# Patient Record
Sex: FEMALE | Race: WHITE | Employment: PART TIME | URBAN - METROPOLITAN AREA
[De-identification: names, ages, dates, MRNs, and addresses within clinical notes are randomized per-mention and may not be internally consistent; named-entity substitution may affect disease eponyms.]

---

## 2017-06-20 ENCOUNTER — HOSPITAL ENCOUNTER (INPATIENT)
Facility: HOSPITAL | Age: 67
LOS: 2 days | Discharge: HOME | DRG: 390 | End: 2017-06-23
Attending: EMERGENCY MEDICINE | Admitting: HOSPITALIST
Payer: MEDICARE

## 2017-06-20 DIAGNOSIS — K56.609 SMALL BOWEL OBSTRUCTION (HCC): Primary | ICD-10-CM

## 2017-06-20 PROCEDURE — 96375 TX/PRO/DX INJ NEW DRUG ADDON: CPT

## 2017-06-20 PROCEDURE — 80053 COMPREHEN METABOLIC PANEL: CPT | Performed by: EMERGENCY MEDICINE

## 2017-06-20 PROCEDURE — 99285 EMERGENCY DEPT VISIT HI MDM: CPT

## 2017-06-20 PROCEDURE — 43753 TX GASTRO INTUB W/ASP: CPT

## 2017-06-20 PROCEDURE — 85025 COMPLETE CBC W/AUTO DIFF WBC: CPT | Performed by: EMERGENCY MEDICINE

## 2017-06-20 PROCEDURE — 96376 TX/PRO/DX INJ SAME DRUG ADON: CPT

## 2017-06-20 PROCEDURE — 0D9670Z DRAINAGE OF STOMACH WITH DRAINAGE DEVICE, VIA NATURAL OR ARTIFICIAL OPENING: ICD-10-PCS | Performed by: EMERGENCY MEDICINE

## 2017-06-20 PROCEDURE — 96361 HYDRATE IV INFUSION ADD-ON: CPT

## 2017-06-20 PROCEDURE — 96374 THER/PROPH/DIAG INJ IV PUSH: CPT

## 2017-06-20 RX ORDER — KETOROLAC TROMETHAMINE 30 MG/ML
15 INJECTION, SOLUTION INTRAMUSCULAR; INTRAVENOUS ONCE
Status: COMPLETED | OUTPATIENT
Start: 2017-06-20 | End: 2017-06-20

## 2017-06-20 RX ORDER — ONDANSETRON 2 MG/ML
4 INJECTION INTRAMUSCULAR; INTRAVENOUS ONCE
Status: COMPLETED | OUTPATIENT
Start: 2017-06-20 | End: 2017-06-20

## 2017-06-21 ENCOUNTER — APPOINTMENT (OUTPATIENT)
Dept: CT IMAGING | Facility: HOSPITAL | Age: 67
DRG: 390 | End: 2017-06-21
Attending: EMERGENCY MEDICINE
Payer: MEDICARE

## 2017-06-21 PROBLEM — R79.89 AZOTEMIA: Status: ACTIVE | Noted: 2017-06-21

## 2017-06-21 PROBLEM — R73.9 HYPERGLYCEMIA: Status: ACTIVE | Noted: 2017-06-21

## 2017-06-21 PROBLEM — D72.829 LEUKOCYTOSIS: Status: ACTIVE | Noted: 2017-06-21

## 2017-06-21 PROBLEM — K56.609 SMALL BOWEL OBSTRUCTION (HCC): Status: ACTIVE | Noted: 2017-06-21

## 2017-06-21 PROCEDURE — C9113 INJ PANTOPRAZOLE SODIUM, VIA: HCPCS | Performed by: HOSPITALIST

## 2017-06-21 PROCEDURE — 74177 CT ABD & PELVIS W/CONTRAST: CPT | Performed by: EMERGENCY MEDICINE

## 2017-06-21 PROCEDURE — 82962 GLUCOSE BLOOD TEST: CPT

## 2017-06-21 RX ORDER — SODIUM CHLORIDE 9 MG/ML
INJECTION, SOLUTION INTRAVENOUS CONTINUOUS
Status: ACTIVE | OUTPATIENT
Start: 2017-06-21 | End: 2017-06-21

## 2017-06-21 RX ORDER — ACETAMINOPHEN 10 MG/ML
1000 INJECTION, SOLUTION INTRAVENOUS EVERY 6 HOURS PRN
Status: DISCONTINUED | OUTPATIENT
Start: 2017-06-21 | End: 2017-06-23

## 2017-06-21 RX ORDER — MORPHINE SULFATE 2 MG/ML
1 INJECTION, SOLUTION INTRAMUSCULAR; INTRAVENOUS EVERY 2 HOUR PRN
Status: DISCONTINUED | OUTPATIENT
Start: 2017-06-21 | End: 2017-06-21

## 2017-06-21 RX ORDER — KETOROLAC TROMETHAMINE 30 MG/ML
30 INJECTION, SOLUTION INTRAMUSCULAR; INTRAVENOUS EVERY 6 HOURS PRN
Status: DISPENSED | OUTPATIENT
Start: 2017-06-21 | End: 2017-06-23

## 2017-06-21 RX ORDER — METOCLOPRAMIDE HYDROCHLORIDE 5 MG/ML
10 INJECTION INTRAMUSCULAR; INTRAVENOUS EVERY 6 HOURS PRN
Status: DISCONTINUED | OUTPATIENT
Start: 2017-06-21 | End: 2017-06-23

## 2017-06-21 RX ORDER — ONDANSETRON 2 MG/ML
4 INJECTION INTRAMUSCULAR; INTRAVENOUS EVERY 4 HOURS PRN
Status: DISCONTINUED | OUTPATIENT
Start: 2017-06-21 | End: 2017-06-21

## 2017-06-21 RX ORDER — HYDROMORPHONE HYDROCHLORIDE 1 MG/ML
1 INJECTION, SOLUTION INTRAMUSCULAR; INTRAVENOUS; SUBCUTANEOUS EVERY 2 HOUR PRN
Status: DISCONTINUED | OUTPATIENT
Start: 2017-06-21 | End: 2017-06-21

## 2017-06-21 RX ORDER — HYDROMORPHONE HYDROCHLORIDE 1 MG/ML
0.5 INJECTION, SOLUTION INTRAMUSCULAR; INTRAVENOUS; SUBCUTANEOUS EVERY 2 HOUR PRN
Status: DISCONTINUED | OUTPATIENT
Start: 2017-06-21 | End: 2017-06-21

## 2017-06-21 RX ORDER — KETOROLAC TROMETHAMINE 30 MG/ML
15 INJECTION, SOLUTION INTRAMUSCULAR; INTRAVENOUS ONCE
Status: COMPLETED | OUTPATIENT
Start: 2017-06-21 | End: 2017-06-21

## 2017-06-21 RX ORDER — DEXTROSE MONOHYDRATE 25 G/50ML
50 INJECTION, SOLUTION INTRAVENOUS
Status: DISCONTINUED | OUTPATIENT
Start: 2017-06-21 | End: 2017-06-23

## 2017-06-21 RX ORDER — ENOXAPARIN SODIUM 100 MG/ML
40 INJECTION SUBCUTANEOUS DAILY
Status: DISCONTINUED | OUTPATIENT
Start: 2017-06-21 | End: 2017-06-23

## 2017-06-21 RX ORDER — SODIUM CHLORIDE 9 MG/ML
INJECTION, SOLUTION INTRAVENOUS CONTINUOUS
Status: DISCONTINUED | OUTPATIENT
Start: 2017-06-21 | End: 2017-06-23

## 2017-06-21 RX ORDER — ONDANSETRON 2 MG/ML
4 INJECTION INTRAMUSCULAR; INTRAVENOUS EVERY 6 HOURS PRN
Status: DISCONTINUED | OUTPATIENT
Start: 2017-06-21 | End: 2017-06-23

## 2017-06-21 RX ORDER — LABETALOL HYDROCHLORIDE 5 MG/ML
10 INJECTION, SOLUTION INTRAVENOUS ONCE
Status: DISCONTINUED | OUTPATIENT
Start: 2017-06-21 | End: 2017-06-23

## 2017-06-21 NOTE — H&P
[facility] Hospitalist H&P       CC: abdominal pain    PCP: Ryan Lira MD    History of Present Illness:     Pt is a 74 yo with HTN/HL, DMII, anxiety, who is admitted for abdominal pain.  Describes it as intermittent moderate abdominal cramping to lower q Every Day Smoker  0.25 Packs/Day  For 36.00 Years     Types: Cigarettes    Smokeless tobacco: Never Used    Alcohol Use: Yes  0.0 - 0.6 oz/week    0-1 Standard drinks or equivalent per week         Comment: occasional        Fam Hx  Family History   Proble ALT  21   AST  15   ALB  3.8       No results for input(s): TROP in the last 72 hours. Radiology: Ct Abdomen+pelvis(contrast Only)(cpt=74177)    6/21/2017  PROCEDURE:  CT ABDOMEN+PELVIS(CONTRAST ONLY)(CPT=74177)  CO  6/21/2017  CONCLUSION:  1.  Macy Dacosta hospitalization lasting longer than 2 midnights. Based on the her current state of illness, I estimate that she will require 4 days of inpatient hospitalization.       Based on the her current state of illness, I anticipate that, after discharge, she violeta

## 2017-06-21 NOTE — PROGRESS NOTES
Patient still c/o abdominal pain. Perfect serve message sent to Dr Bello for additional pain med order.

## 2017-06-21 NOTE — PROGRESS NOTES
NURSING ADMISSION NOTE      Patient admitted via cart from ER  Oriented to room. Safety precautions initiated. Bed in low position. Call light in reach.   NG to low intermittent suction

## 2017-06-21 NOTE — PROGRESS NOTES
Brief Internal Medicine Note    Full Note to Follow      Pt is a 74 yo with HTN/HL, DMII, anxiety, who is admitted for abdominal pain.   Describes it as intermittent moderate abdominal cramping to lower quadrants with no exacerbating or alleviating featur

## 2017-06-21 NOTE — ED PROVIDER NOTES
Patient Seen in: [facility] Emergency Department    History   Patient presents with:  Abdomen/Flank Pain (GI/)    Stated Complaint: c/o abd pain with n/v    HPI    43-year-old with a history of diabetes, hypertension, high cholesterol, anxiety, pr FOOD   lisinopril 10 MG Oral Tab,  Take 1 tablet (10 mg total) by mouth once daily.    ATORVASTATIN 10 MG Oral Tab,  TAKE ONE TABLET BY MOUTH EVERY EVENING   TRIAMTERENE-HCTZ 37.5-25 MG Oral Tab,  TAKE ONE TABLET BY MOUTH EVERY DAY   Triamterene-HCTZ 37.5-2 Pulse 84  Temp(Src) 97.8 °F (36.6 °C) (Temporal)  Resp 11  Ht 170.2 cm (5' 7\")  Wt 84.369 kg  BMI 29.12 kg/m2  SpO2 98%        Physical Exam    General: Patient is awake, alert in no acute distress. HEENT: Sclera are not icteric.   Conjunctivae within no the prior study  Patient was given IV fluids, Zofran, Toradol.   NG tube was ordered per general surgery recommendation  MDM     27-year-old with history of bowel obstruction presents with recurrent symptoms and CT demonstrates recurrent small bowel obstruc

## 2017-06-21 NOTE — ED INITIAL ASSESSMENT (HPI)
Pt with c/o abd pain with nausea starting today. Hx of bowel obstruction in the past, feels similar.

## 2017-06-21 NOTE — PROGRESS NOTES
Patient improved since receiving Reglan IV. Refusing pain medication at this time. Decreased nausea.

## 2017-06-21 NOTE — CONSULTS
[facility]  Report of Consultation    Samy Ellis Patient Status:  Inpatient    1950 MRN [id number]   [facility] 3SW-A Attending Valerie Koenig, *   Hosp Day # 1 PCP Miller Fraser MD     Reason for Consultation:    Surg She reports that she drinks alcohol. She reports that she does not use illicit drugs.     Allergies:      Bees                    Itching, Swelling  Codeine [Opioid Goldie*      Morphine [Morphine *    Nausea and vomiting  Statins                     Current M LANCETS 33G Does not apply Misc TEST BLOOD GLUCOSE TWO TIMES A DAY Disp: 200 each Rfl: 3   aspirin 81 MG Oral Tab Take 81 mg by mouth daily.  Disp:  Rfl:    Blood Glucose Monitoring Suppl (ONETOUCH ULTRA MINI) W/DEVICE Does not apply Kit Test blood sugar on transmitted to the [facility] [employer] of Radiology) Valerie Machado 35 ([address]) which includes the Dose Index Registry.   PATIENT STATED HISTORY:(As transcribed by Technologist)  Patient complaints of abdominal pain , nausea, vomiting since y Approved by: Marilee Ley MD               Problem List:    Patient Active Problem List:     Hypertension     Diabetes mellitus ([facility] [address]     Tobacco use disorder     Overweight (BMI 25.0-29. 9)     PVD (posterior vitreous detachment)     Hyperlipidemia     An

## 2017-06-22 PROCEDURE — 85025 COMPLETE CBC W/AUTO DIFF WBC: CPT | Performed by: HOSPITALIST

## 2017-06-22 PROCEDURE — 82962 GLUCOSE BLOOD TEST: CPT

## 2017-06-22 PROCEDURE — 83036 HEMOGLOBIN GLYCOSYLATED A1C: CPT | Performed by: HOSPITALIST

## 2017-06-22 PROCEDURE — C9113 INJ PANTOPRAZOLE SODIUM, VIA: HCPCS | Performed by: HOSPITALIST

## 2017-06-22 NOTE — PLAN OF CARE
Pt tolerated NG tube clamped for 6hr. No nausea or pain. No residual. NG tube DC'd per order from PA. Started on CLD. Will continue to leona.

## 2017-06-22 NOTE — PROGRESS NOTES
[facility]  Progress Note    Caryle Collard Patient Status:  Inpatient    1950 MRN [id number]   [facility] Attending Conchetta [place], *   Hosp Day # 2 PCP Oanh Waldron MD     Subjective:    Patient passing flatus, fe

## 2017-06-22 NOTE — PROGRESS NOTES
[facility] Hospitalist Progress Note     PCP: Hoa Jackson MD    CC:  Follow up    SUBJECTIVE:  Sitting up in chair, passing flatus. NG clamped. Talking about grandchildren, football games, college.  In good spirits    OBJECTIVE:  Temp:  [97.7 °F (36.5 °C)-98.4 **OR** dextrose 50% **OR** glucose **OR** Glucose-Vitamin C       Assessment/Plan:       72 yo with HTN/HL, DMII, anxiety, who is admitted for abdominal pain.       **abdominal pain--pt with bowel obstruction with transition point to R mid abdomen on CT-im

## 2017-06-23 VITALS
WEIGHT: 186 LBS | HEIGHT: 67 IN | RESPIRATION RATE: 20 BRPM | TEMPERATURE: 99 F | DIASTOLIC BLOOD PRESSURE: 45 MMHG | HEART RATE: 59 BPM | BODY MASS INDEX: 29.19 KG/M2 | OXYGEN SATURATION: 98 % | SYSTOLIC BLOOD PRESSURE: 141 MMHG

## 2017-06-23 PROCEDURE — C9113 INJ PANTOPRAZOLE SODIUM, VIA: HCPCS | Performed by: HOSPITALIST

## 2017-06-23 PROCEDURE — 82962 GLUCOSE BLOOD TEST: CPT

## 2017-06-23 NOTE — DISCHARGE SUMMARY
General Medicine Discharge Summary     Patient ID:  Lizz Zepeda  73year old  [id number]  7/28/1950    Admit date: 6/20/2017    Discharge date and time: 6/23/2017  4:20 PM     Attending Physician:     Primary Care Physician: Luis Prado MD     Reason ordered    **DMII-hold orals. Iss, accuchecks. HbA1c 7.7-- f/u with PCP    **anxiety-no acute issues. Not on meds at home.  Monitor    **leukocytosis-reactive, monitor  Consults: IP CONSULT TO GENERAL SURGERY  IP CONSULT TO HOSPITALIST    Radiology:  Ct Abd evidence for acute diverticulitis. Small amount of free fluid within the pelvis. ABDOMINAL WALL:  Stable. URINARY BLADDER:  Normal.  No visible focal wall thickening, lesion, or calculus. PELVIC NODES:  Normal.  No adenopathy.   PELVIC ORGANS:  Pelvic or 2-YEN) 0.3 MG/0.3ML Injection Device  Inject 2 Syringes as directed once. Normal, 2 Syringe, Injection, Once, Disp-2 Device, R-1          Home Medication Changes:      Activity: activity as tolerated  Diet: as directed  Wound Care: none needed  Code Status:

## 2017-06-23 NOTE — PLAN OF CARE
Pt discharge education completed. All questions addressed. All pt belongings packed and sent with pt.  Discharged home via personal car

## 2017-06-23 NOTE — PROGRESS NOTES
[facility]  Progress Note    Nicole Oar Patient Status:  Inpatient    1950 MRN [id number]   [facility] 3SW-A Attending Meera Weldon, *   Hosp Day # 3 PCP Ignacio Awad MD     Subjective:    Patient tolerating NG clam

## 2017-10-04 PROCEDURE — 82570 ASSAY OF URINE CREATININE: CPT | Performed by: INTERNAL MEDICINE

## 2017-10-04 PROCEDURE — 82043 UR ALBUMIN QUANTITATIVE: CPT | Performed by: INTERNAL MEDICINE

## 2017-10-04 PROCEDURE — 86803 HEPATITIS C AB TEST: CPT | Performed by: INTERNAL MEDICINE

## 2017-10-12 PROBLEM — R73.9 HYPERGLYCEMIA: Status: RESOLVED | Noted: 2017-06-21 | Resolved: 2017-10-12

## 2017-10-12 PROBLEM — R79.89 AZOTEMIA: Status: RESOLVED | Noted: 2017-06-21 | Resolved: 2017-10-12

## 2017-10-12 PROBLEM — D72.829 LEUKOCYTOSIS: Status: RESOLVED | Noted: 2017-06-21 | Resolved: 2017-10-12

## 2017-10-12 PROBLEM — K56.609 SMALL BOWEL OBSTRUCTION (HCC): Status: RESOLVED | Noted: 2017-06-21 | Resolved: 2017-10-12

## 2018-03-09 ENCOUNTER — HOSPITAL ENCOUNTER (OUTPATIENT)
Age: 68
Discharge: HOME | End: 2018-03-09
Attending: FAMILY MEDICINE
Payer: MEDICARE

## 2018-03-09 VITALS
HEART RATE: 102 BPM | OXYGEN SATURATION: 97 % | RESPIRATION RATE: 20 BRPM | DIASTOLIC BLOOD PRESSURE: 71 MMHG | TEMPERATURE: 99 F | SYSTOLIC BLOOD PRESSURE: 136 MMHG

## 2018-03-09 DIAGNOSIS — L50.9 URTICARIA: Primary | ICD-10-CM

## 2018-03-09 PROCEDURE — 99213 OFFICE O/P EST LOW 20 MIN: CPT

## 2018-03-09 PROCEDURE — 99214 OFFICE O/P EST MOD 30 MIN: CPT

## 2018-03-09 RX ORDER — EPINEPHRINE 0.3 MG/.3ML
0.3 INJECTION SUBCUTANEOUS ONCE
Qty: 1 EACH | Refills: 0 | Status: SHIPPED | OUTPATIENT
Start: 2018-03-09 | End: 2018-03-09

## 2018-03-09 RX ORDER — FAMOTIDINE 20 MG/1
20 TABLET ORAL 2 TIMES DAILY
Qty: 14 TABLET | Refills: 0 | Status: SHIPPED | OUTPATIENT
Start: 2018-03-09 | End: 2018-03-16

## 2018-03-09 NOTE — ED INITIAL ASSESSMENT (HPI)
Pt here with swelling to right eye, right ear lobe and hives to the back of her head since this morning. Also reports non productive cough x 2 days. Denies difficulty breathing, swallowing.

## 2018-03-09 NOTE — ED PROVIDER NOTES
Patient Seen in: [facility]    History   Patient presents with:   Allergic Rxn Allergies (immune)  Cough/URI    Stated Complaint: right ear & eyelid swollen,lumps on back of head    HPI    This 15-year-old female presents to the eyelid swollen,lumps on back of head  Other systems are as noted in HPI. Constitutional and vital signs reviewed. All other systems reviewed and negative except as noted above.     Physical Exam   ED Triage Vitals [03/09/18 0910]  BP: 136/71  Pulse: 1 doctor in 3-5 days for any new or worsening symptoms. I advised the patient that I would like to avoid any prednisone at this time since her hives are mild and she is a diabetic.         Disposition and Plan     Clinical Impression:  Urticaria  (primary en

## 2018-05-25 PROCEDURE — 82043 UR ALBUMIN QUANTITATIVE: CPT | Performed by: INTERNAL MEDICINE

## 2018-05-25 PROCEDURE — 82570 ASSAY OF URINE CREATININE: CPT | Performed by: INTERNAL MEDICINE

## 2020-07-24 PROBLEM — I70.0 AORTIC ATHEROSCLEROSIS (HCC): Status: ACTIVE | Noted: 2020-07-24

## 2021-01-28 PROBLEM — R80.9 TYPE 2 DIABETES MELLITUS WITH MICROALBUMINURIA, WITHOUT LONG-TERM CURRENT USE OF INSULIN: Status: ACTIVE | Noted: 2021-01-28

## 2021-01-28 PROBLEM — E11.29 TYPE 2 DIABETES MELLITUS WITH MICROALBUMINURIA, WITHOUT LONG-TERM CURRENT USE OF INSULIN: Status: ACTIVE | Noted: 2021-01-28

## 2021-01-28 PROBLEM — R80.9 TYPE 2 DIABETES MELLITUS WITH MICROALBUMINURIA, WITHOUT LONG-TERM CURRENT USE OF INSULIN (HCC): Status: ACTIVE | Noted: 2021-01-28

## 2021-01-28 PROBLEM — E11.29 TYPE 2 DIABETES MELLITUS WITH MICROALBUMINURIA, WITHOUT LONG-TERM CURRENT USE OF INSULIN (HCC): Status: ACTIVE | Noted: 2021-01-28

## 2021-01-29 PROBLEM — E11.9 TYPE 2 DIABETES MELLITUS WITHOUT COMPLICATION, WITHOUT LONG-TERM CURRENT USE OF INSULIN (HCC): Status: ACTIVE | Noted: 2021-01-28

## 2022-06-08 ENCOUNTER — HOSPITAL ENCOUNTER (OUTPATIENT)
Age: 72
Discharge: HOME | End: 2022-06-08
Payer: MEDICARE

## 2022-06-08 VITALS
BODY MASS INDEX: 27 KG/M2 | OXYGEN SATURATION: 98 % | TEMPERATURE: 98 F | DIASTOLIC BLOOD PRESSURE: 53 MMHG | HEART RATE: 70 BPM | SYSTOLIC BLOOD PRESSURE: 170 MMHG | RESPIRATION RATE: 18 BRPM | WEIGHT: 165.38 LBS

## 2022-06-08 DIAGNOSIS — S91.311A LACERATION OF RIGHT FOOT, INITIAL ENCOUNTER: Primary | ICD-10-CM

## 2022-06-08 PROCEDURE — 99202 OFFICE O/P NEW SF 15 MIN: CPT

## 2022-06-08 PROCEDURE — 12002 RPR S/N/AX/GEN/TRNK2.6-7.5CM: CPT

## 2022-06-08 PROCEDURE — 99203 OFFICE O/P NEW LOW 30 MIN: CPT

## 2022-06-18 ENCOUNTER — HOSPITAL ENCOUNTER (OUTPATIENT)
Age: 72
Discharge: HOME | End: 2022-06-18
Payer: MEDICARE

## 2022-06-18 VITALS
OXYGEN SATURATION: 99 % | BODY MASS INDEX: 26.68 KG/M2 | HEIGHT: 66 IN | HEART RATE: 76 BPM | RESPIRATION RATE: 18 BRPM | DIASTOLIC BLOOD PRESSURE: 79 MMHG | SYSTOLIC BLOOD PRESSURE: 156 MMHG | WEIGHT: 166 LBS | TEMPERATURE: 98 F

## 2022-06-18 DIAGNOSIS — Z48.02 ENCOUNTER FOR REMOVAL OF SUTURES: Primary | ICD-10-CM

## 2022-08-26 ENCOUNTER — TELEPHONE (OUTPATIENT)
Dept: UROLOGY | Facility: CLINIC | Age: 72
End: 2022-08-26

## 2022-08-26 NOTE — TELEPHONE ENCOUNTER
Left voice message for patient to call back to r/s appt with Dr Dwight Gong on 9/1. Patient is an HMO and there is NO referral on file.

## 2022-08-31 ENCOUNTER — TELEPHONE (OUTPATIENT)
Dept: UROLOGY | Facility: CLINIC | Age: 72
End: 2022-08-31

## 2022-08-31 NOTE — TELEPHONE ENCOUNTER
TC to ptCynthia RAJAN on  reagrding upcoming appt. Informed of new address and advised to call Dr Rebecca Lopez office for [facility] referral. Not seeing one in place on our end.

## 2022-09-01 ENCOUNTER — OFFICE VISIT (OUTPATIENT)
Dept: UROLOGY | Facility: CLINIC | Age: 72
End: 2022-09-01
Attending: OBSTETRICS & GYNECOLOGY
Payer: MEDICARE

## 2022-09-01 VITALS — HEIGHT: 66 IN | BODY MASS INDEX: 26.68 KG/M2 | TEMPERATURE: 98 F | WEIGHT: 166 LBS

## 2022-09-01 DIAGNOSIS — N39.3 FEMALE STRESS INCONTINENCE: ICD-10-CM

## 2022-09-01 DIAGNOSIS — N39.41 URGE INCONTINENCE: ICD-10-CM

## 2022-09-01 DIAGNOSIS — N81.2 UTEROVAGINAL PROLAPSE, INCOMPLETE: Primary | ICD-10-CM

## 2022-09-01 DIAGNOSIS — N95.2 POSTMENOPAUSAL ATROPHIC VAGINITIS: ICD-10-CM

## 2022-09-01 DIAGNOSIS — N81.84 PELVIC MUSCLE WASTING: ICD-10-CM

## 2022-09-01 DIAGNOSIS — R35.1 NOCTURIA: ICD-10-CM

## 2022-09-01 LAB
BILIRUB UR QL STRIP.AUTO: NEGATIVE
CLARITY UR REFRACT.AUTO: CLEAR
COLOR UR AUTO: YELLOW
CONTROL RUN WITHIN 24 HOURS?: YES
GLUCOSE UR STRIP.AUTO-MCNC: NEGATIVE MG/DL
KETONES UR STRIP.AUTO-MCNC: NEGATIVE MG/DL
LEUKOCYTE ESTERASE UR QL STRIP.AUTO: NEGATIVE
LEUKOCYTE ESTERASE URINE: NEGATIVE
NITRITE UR QL STRIP.AUTO: NEGATIVE
NITRITE URINE: NEGATIVE
PH UR STRIP.AUTO: 7.5 [PH] (ref 5–8)
PROT UR STRIP.AUTO-MCNC: NEGATIVE MG/DL
RBC UR QL AUTO: NEGATIVE
SP GR UR STRIP.AUTO: 1.01 (ref 1–1.03)
UROBILINOGEN UR STRIP.AUTO-MCNC: 0.2 MG/DL

## 2022-09-01 PROCEDURE — 87086 URINE CULTURE/COLONY COUNT: CPT | Performed by: OBSTETRICS & GYNECOLOGY

## 2022-09-01 PROCEDURE — 99212 OFFICE O/P EST SF 10 MIN: CPT

## 2022-09-01 PROCEDURE — 81002 URINALYSIS NONAUTO W/O SCOPE: CPT | Performed by: OBSTETRICS & GYNECOLOGY

## 2022-09-01 PROCEDURE — 51701 INSERT BLADDER CATHETER: CPT | Performed by: OBSTETRICS & GYNECOLOGY

## 2022-09-01 PROCEDURE — 81003 URINALYSIS AUTO W/O SCOPE: CPT | Performed by: OBSTETRICS & GYNECOLOGY

## 2022-10-05 ENCOUNTER — OFFICE VISIT (OUTPATIENT)
Dept: UROLOGY | Facility: CLINIC | Age: 72
End: 2022-10-05
Attending: OBSTETRICS & GYNECOLOGY
Payer: MEDICARE

## 2022-10-05 VITALS — RESPIRATION RATE: 16 BRPM | HEIGHT: 66 IN | WEIGHT: 166 LBS | BODY MASS INDEX: 26.68 KG/M2 | TEMPERATURE: 98 F

## 2022-10-05 DIAGNOSIS — N39.3 FEMALE STRESS INCONTINENCE: ICD-10-CM

## 2022-10-05 DIAGNOSIS — N39.41 URGE INCONTINENCE: ICD-10-CM

## 2022-10-05 DIAGNOSIS — N81.2 UTEROVAGINAL PROLAPSE, INCOMPLETE: Primary | ICD-10-CM

## 2022-10-05 LAB
CONTROL RUN WITHIN 24 HOURS?: YES
LEUKOCYTE ESTERASE URINE: NEGATIVE
NITRITE URINE: NEGATIVE

## 2022-10-05 PROCEDURE — 51741 ELECTRO-UROFLOWMETRY FIRST: CPT

## 2022-10-05 PROCEDURE — 51784 ANAL/URINARY MUSCLE STUDY: CPT

## 2022-10-05 PROCEDURE — 51797 INTRAABDOMINAL PRESSURE TEST: CPT

## 2022-10-05 PROCEDURE — 81002 URINALYSIS NONAUTO W/O SCOPE: CPT

## 2022-10-05 PROCEDURE — 51729 CYSTOMETROGRAM W/VP&UP: CPT

## 2022-10-05 NOTE — PROCEDURES
Patient here for urodynamic testing. Procedure explained and confirmed by patient. See evaluation form for results. Both verbal and written discharge instructions were given. Patient tolerated procedure well and will follow up with Dr. Lucky Angelucci on PHONE  10/25/22. URODYNAMIC EVALUATION    PATIENT HISTORY:    Prolapse:  Yes  NEYMAR:  Yes  UUI:  Yes  Nocturia:  1  Frequency:  every 1-2 hours  Incomplete Emptying:  Yes  Constipation:  No  Last void prior to UDS testin minutes  Current urge to void? Moderate  OAB meds stopped prior to test?  NA  Other symptoms? Surgery? 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 , Disp: , Rfl:   atorvastatin 10 MG Oral Tab, Take 1 tablet (10 mg total) by mouth every evening., Disp: 90 tablet, Rfl: 1  Triamterene-HCTZ 37.5-25 MG Oral Tab, Take 1 tablet by mouth daily. , Disp: 90 tablet, Rfl: 1  metFORMIN HCl 1000 MG Oral Tab, Take 1 tablet (1,000 mg total) by mouth 2 (two) times daily with meals. , Disp: 180 tablet, Rfl: 1  lisinopril 10 MG Oral Tab, Take 1 tablet (10 mg total) by mouth daily. , Disp: 90 tablet, Rfl: 1  Glucose Blood (ACCU-CHEK GUIDE) In Vitro Strip, 1 strip by In Vitro route daily. , Disp: 200 strip, Rfl: 3         ALLERGIES:  Latex, Bees, Codeine [Opioid Analgesics], Morphine [Morphine Sulfate In Dextrose], and Statins      EXAM:  Urinalysis Dip:  Today's Results   Component Date Value    control run 10/05/2022 Yes     Blood Urine 10/05/2022 Trace (A)    Nitrite Urine 10/05/2022 Negative     Leukocyte esterase urine 10/05/2022 Negative       Urovesico Junction ( > 30 degrees ):  [x]  Mobile  []  Fixed    Perineal Sensation:  [x]  Normal  []  Abnormal    Additional Notes:    PROLAPSE (past introitus):  [x]  Yes  []  No  Prolapse reduced for testing? [x]  Yes  []  No  [x]  Pessary  []  Speculum  []  Proctoswab  []  Vag Packing    Additional Notes:    UROFLOWMETRY:( UNREDUCED)  Voided Volume:                         140    mL  Maximum Flow Rate:                               7 mL/sec  Average flow rate:                            5 mL/sec  Post-void Residual:                          165 mL  Pattern:  []  Normal  []  Poor flow     [x]  Intermittent  []  Other  Void:   [x]  Typical  []  Atypical    Additional Notes:    CYSTOMETRY:  Urethral Catheter:  Fr 7 / tdoc  Abd Catheter:     Fr 7 / tdoc   Infusion:  Water Rate 50 mL/min reduced to 40 ml/ min  Temp:  Room  Position:  [x]  Sit  []  Stand  []  Supine  First sensation:   129 mL  First desire to void:   163 mL  Strong desire to void:  248 mL  Maximum cystometric capacity:   309 mL  Detrusor Activity:  [x]  Unstable   []  Stable  Urge leakage? []  Yes [x]  No  Volume at 1st unhibited detrusor cont:    163mL  Detrusor instability provoked by:    []  Spontaneous []  Coughing  [x]  Filling  []  Valsalva  []  Other    Additional Notes:      URETHRAL FUNCTION:  Valsava (vesical) Leak Point Pressures:  REDUCED with # 3 ring pessary    Volume Leak Point Pressure Leak? Cough Valsalva      100mL 153 75    cm H2O []  Yes [x]  No   170mL 124 90    cm H2O [x]  Yes []  No       Genuine Stress Incontinence demonstrated?    [x]  Yes  reduced at 170 ml []  No    Resting Urethral Pressure Profile:     Functional Urethral Length:      0.4    cm          0.4       cm                 cm  Maximum UCP:          41 cm          38   cm                  cm    PRESSURE/FLOW STUDY: ( REDUCED)  Voided volume:    315    mL  Maximum flow rate:       8.2 mL/sec  Pressure Detrusor (at maximum flow):           113 cm H2O  Post void residual:        140       mL  Voiding mechanism:  []  Abnormal  [x]  Normal  []  Strain to void   []  Weak detrusor  Void:   [x]  Typical   []  Atypical    Additional Notes: Pessary removed at completion of flow study    EMG:  [x]  Reactive []  Non-Reactive    10/5/2022 2:20 PM     PERFORMED BY:  Efrain Brian RN      URODYNAMIC PHYSICIAN INTERPRETATION    IMPRESSION:   140/165cc & 315/140cc  custodial 309cc  DO @ 163cc  NEYMAR Yes               reduced at 170 ml      Post-Procedure Diagnoses: Detrusor instability [N31.9], Incomplete bladder emptying [R39.14], and Stress urinary incontinence [N39.3]    Comments:      Macarena Colon DO   10/6/2022   7:27 AM

## 2022-10-25 ENCOUNTER — VIRTUAL PHONE E/M (OUTPATIENT)
Dept: UROLOGY | Facility: CLINIC | Age: 72
End: 2022-10-25
Attending: OBSTETRICS & GYNECOLOGY
Payer: MEDICARE

## 2022-10-25 VITALS — BODY MASS INDEX: 26.68 KG/M2 | WEIGHT: 166 LBS | HEIGHT: 66 IN

## 2022-10-25 DIAGNOSIS — N32.81 DETRUSOR INSTABILITY: ICD-10-CM

## 2022-10-25 DIAGNOSIS — N39.3 FEMALE STRESS INCONTINENCE: ICD-10-CM

## 2022-10-25 DIAGNOSIS — N81.2 UTEROVAGINAL PROLAPSE, INCOMPLETE: Primary | ICD-10-CM

## 2022-10-25 DIAGNOSIS — R35.1 NOCTURIA: ICD-10-CM

## 2022-10-25 DIAGNOSIS — N39.41 URGE INCONTINENCE: ICD-10-CM

## 2022-10-25 DIAGNOSIS — R33.9 INCOMPLETE BLADDER EMPTYING: ICD-10-CM

## 2022-11-08 RX ORDER — CETIRIZINE HYDROCHLORIDE 10 MG/1
10 TABLET ORAL
COMMUNITY

## 2022-11-14 ENCOUNTER — LABORATORY ENCOUNTER (OUTPATIENT)
Dept: LAB | Age: 72
End: 2022-11-14
Attending: OBSTETRICS & GYNECOLOGY
Payer: MEDICARE

## 2022-11-14 ENCOUNTER — LAB ENCOUNTER (OUTPATIENT)
Dept: LAB | Age: 72
End: 2022-11-14
Attending: OBSTETRICS & GYNECOLOGY
Payer: MEDICARE

## 2022-11-14 DIAGNOSIS — Z01.818 PRE-OP TESTING: ICD-10-CM

## 2022-11-14 LAB
ANTIBODY SCREEN: NEGATIVE
RH BLOOD TYPE: POSITIVE

## 2022-11-14 PROCEDURE — 86901 BLOOD TYPING SEROLOGIC RH(D): CPT

## 2022-11-14 PROCEDURE — 86900 BLOOD TYPING SEROLOGIC ABO: CPT

## 2022-11-14 PROCEDURE — 86850 RBC ANTIBODY SCREEN: CPT

## 2022-11-15 LAB — SARS-COV-2 RNA RESP QL NAA+PROBE: NOT DETECTED

## 2022-11-16 NOTE — H&P
72 y/o female with uterovaginal prolapse and stress urinary incontinence for surgical mgmt  Pre operative clearance with Dr Ozzie Dillon, pt seen & examined without changes. Thorough discussion of surgical risks, benefits, and alternatives including, but not limited to bleeding/clots, infection, injury to nearby organs (urethra, bladder, ureters, bowel, blood vessels), mesh erosion, dyspareunia, de abdiel UI, worsening UI, recurrence, voiding dysfunction, and pain. Discussed pain mgmt and potential need for narcotics. Discussed addiction potential with narcotics. IL  reviewed. Plan to proceed with TVH poss BSO (Weeks) USVVS APE Repair, MUS, cysto (+cath) as consented    All questions answered.    Christopher Menard  [phone]

## 2022-11-17 ENCOUNTER — ANESTHESIA (OUTPATIENT)
Dept: SURGERY | Facility: HOSPITAL | Age: 72
End: 2022-11-17
Payer: MEDICARE

## 2022-11-17 ENCOUNTER — HOSPITAL ENCOUNTER (OUTPATIENT)
Facility: HOSPITAL | Age: 72
Discharge: HOME | End: 2022-11-18
Attending: OBSTETRICS & GYNECOLOGY | Admitting: OBSTETRICS & GYNECOLOGY
Payer: MEDICARE

## 2022-11-17 ENCOUNTER — ANESTHESIA EVENT (OUTPATIENT)
Dept: SURGERY | Facility: HOSPITAL | Age: 72
End: 2022-11-17
Payer: MEDICARE

## 2022-11-17 DIAGNOSIS — Z01.818 PRE-OP TESTING: Primary | ICD-10-CM

## 2022-11-17 PROBLEM — N81.2 UTEROVAGINAL PROLAPSE, INCOMPLETE: Status: ACTIVE | Noted: 2022-11-17

## 2022-11-17 LAB
GLUCOSE BLD-MCNC: 136 MG/DL (ref 70–99)
GLUCOSE BLD-MCNC: 168 MG/DL (ref 70–99)
GLUCOSE BLD-MCNC: 205 MG/DL (ref 70–99)
GLUCOSE BLD-MCNC: 215 MG/DL (ref 70–99)

## 2022-11-17 PROCEDURE — 82962 GLUCOSE BLOOD TEST: CPT

## 2022-11-17 PROCEDURE — 0UT77ZZ RESECTION OF BILATERAL FALLOPIAN TUBES, VIA NATURAL OR ARTIFICIAL OPENING: ICD-10-PCS | Performed by: OBSTETRICS & GYNECOLOGY

## 2022-11-17 PROCEDURE — 0TJB8ZZ INSPECTION OF BLADDER, VIA NATURAL OR ARTIFICIAL OPENING ENDOSCOPIC: ICD-10-PCS | Performed by: OBSTETRICS & GYNECOLOGY

## 2022-11-17 PROCEDURE — 0TSD0ZZ REPOSITION URETHRA, OPEN APPROACH: ICD-10-PCS | Performed by: OBSTETRICS & GYNECOLOGY

## 2022-11-17 PROCEDURE — 0USG7ZZ REPOSITION VAGINA, VIA NATURAL OR ARTIFICIAL OPENING: ICD-10-PCS | Performed by: OBSTETRICS & GYNECOLOGY

## 2022-11-17 PROCEDURE — 0UT97ZZ RESECTION OF UTERUS, VIA NATURAL OR ARTIFICIAL OPENING: ICD-10-PCS | Performed by: OBSTETRICS & GYNECOLOGY

## 2022-11-17 PROCEDURE — 88305 TISSUE EXAM BY PATHOLOGIST: CPT | Performed by: OBSTETRICS & GYNECOLOGY

## 2022-11-17 PROCEDURE — 0UQF0ZZ REPAIR CUL-DE-SAC, OPEN APPROACH: ICD-10-PCS | Performed by: OBSTETRICS & GYNECOLOGY

## 2022-11-17 PROCEDURE — 0JQC0ZZ REPAIR PELVIC REGION SUBCUTANEOUS TISSUE AND FASCIA, OPEN APPROACH: ICD-10-PCS | Performed by: OBSTETRICS & GYNECOLOGY

## 2022-11-17 DEVICE — TRANSVAGINAL MID-URETHRAL SLING
Type: IMPLANTABLE DEVICE | Status: FUNCTIONAL
Brand: ADVANTAGE FIT™ BLUE SYSTEM

## 2022-11-17 RX ORDER — MAGNESIUM OXIDE 400 MG/1
400 TABLET ORAL DAILY
COMMUNITY

## 2022-11-17 RX ORDER — NICOTINE POLACRILEX 4 MG
15 LOZENGE BUCCAL
Status: DISCONTINUED | OUTPATIENT
Start: 2022-11-17 | End: 2022-11-18

## 2022-11-17 RX ORDER — MEPERIDINE HYDROCHLORIDE 25 MG/ML
12.5 INJECTION INTRAMUSCULAR; INTRAVENOUS; SUBCUTANEOUS AS NEEDED
Status: DISCONTINUED | OUTPATIENT
Start: 2022-11-17 | End: 2022-11-17 | Stop reason: HOSPADM

## 2022-11-17 RX ORDER — GLYCOPYRROLATE 0.2 MG/ML
0.4 INJECTION, SOLUTION INTRAMUSCULAR; INTRAVENOUS ONCE
Status: COMPLETED | OUTPATIENT
Start: 2022-11-17 | End: 2022-11-17

## 2022-11-17 RX ORDER — KETOROLAC TROMETHAMINE 15 MG/ML
15 INJECTION, SOLUTION INTRAMUSCULAR; INTRAVENOUS EVERY 6 HOURS
Status: COMPLETED | OUTPATIENT
Start: 2022-11-17 | End: 2022-11-18

## 2022-11-17 RX ORDER — HYDROMORPHONE HYDROCHLORIDE 1 MG/ML
0.2 INJECTION, SOLUTION INTRAMUSCULAR; INTRAVENOUS; SUBCUTANEOUS EVERY 5 MIN PRN
Status: DISCONTINUED | OUTPATIENT
Start: 2022-11-17 | End: 2022-11-17 | Stop reason: HOSPADM

## 2022-11-17 RX ORDER — SODIUM CHLORIDE, SODIUM LACTATE, POTASSIUM CHLORIDE, CALCIUM CHLORIDE 600; 310; 30; 20 MG/100ML; MG/100ML; MG/100ML; MG/100ML
INJECTION, SOLUTION INTRAVENOUS CONTINUOUS
Status: DISCONTINUED | OUTPATIENT
Start: 2022-11-17 | End: 2022-11-18

## 2022-11-17 RX ORDER — KETOROLAC TROMETHAMINE 30 MG/ML
INJECTION, SOLUTION INTRAMUSCULAR; INTRAVENOUS AS NEEDED
Status: DISCONTINUED | OUTPATIENT
Start: 2022-11-17 | End: 2022-11-17 | Stop reason: SURG

## 2022-11-17 RX ORDER — NALOXONE HYDROCHLORIDE 0.4 MG/ML
80 INJECTION, SOLUTION INTRAMUSCULAR; INTRAVENOUS; SUBCUTANEOUS AS NEEDED
Status: DISCONTINUED | OUTPATIENT
Start: 2022-11-17 | End: 2022-11-17 | Stop reason: HOSPADM

## 2022-11-17 RX ORDER — CALCIUM CARB/VITAMIN D3/VIT K1 500-500-40
1000 TABLET,CHEWABLE ORAL DAILY
COMMUNITY
End: 2022-11-18

## 2022-11-17 RX ORDER — ACETAMINOPHEN 500 MG
1000 TABLET ORAL ONCE AS NEEDED
Status: DISCONTINUED | OUTPATIENT
Start: 2022-11-17 | End: 2022-11-17 | Stop reason: HOSPADM

## 2022-11-17 RX ORDER — TRIAMTERENE AND HYDROCHLOROTHIAZIDE 37.5; 25 MG/1; MG/1
1 CAPSULE ORAL DAILY
Status: DISCONTINUED | OUTPATIENT
Start: 2022-11-18 | End: 2022-11-18

## 2022-11-17 RX ORDER — NICOTINE POLACRILEX 4 MG
30 LOZENGE BUCCAL
Status: DISCONTINUED | OUTPATIENT
Start: 2022-11-17 | End: 2022-11-17 | Stop reason: HOSPADM

## 2022-11-17 RX ORDER — CEFAZOLIN SODIUM/WATER 2 G/20 ML
2 SYRINGE (ML) INTRAVENOUS ONCE
Status: COMPLETED | OUTPATIENT
Start: 2022-11-17 | End: 2022-11-17

## 2022-11-17 RX ORDER — DEXTROSE MONOHYDRATE 25 G/50ML
50 INJECTION, SOLUTION INTRAVENOUS
Status: DISCONTINUED | OUTPATIENT
Start: 2022-11-17 | End: 2022-11-18

## 2022-11-17 RX ORDER — CALCIUM/MAGNESIUM/ZINC 333-133 MG
400 TABLET ORAL DAILY
COMMUNITY

## 2022-11-17 RX ORDER — NEOSTIGMINE METHYLSULFATE 1 MG/ML
INJECTION, SOLUTION INTRAVENOUS AS NEEDED
Status: DISCONTINUED | OUTPATIENT
Start: 2022-11-17 | End: 2022-11-17 | Stop reason: SURG

## 2022-11-17 RX ORDER — CETIRIZINE HYDROCHLORIDE 10 MG/1
10 TABLET ORAL DAILY PRN
Status: DISCONTINUED | OUTPATIENT
Start: 2022-11-17 | End: 2022-11-18

## 2022-11-17 RX ORDER — LISINOPRIL 10 MG/1
10 TABLET ORAL DAILY
Status: DISCONTINUED | OUTPATIENT
Start: 2022-11-17 | End: 2022-11-18

## 2022-11-17 RX ORDER — CEFAZOLIN SODIUM/WATER 2 G/20 ML
SYRINGE (ML) INTRAVENOUS
Status: DISPENSED
Start: 2022-11-17 | End: 2022-11-17

## 2022-11-17 RX ORDER — LIDOCAINE HYDROCHLORIDE 10 MG/ML
INJECTION, SOLUTION EPIDURAL; INFILTRATION; INTRACAUDAL; PERINEURAL AS NEEDED
Status: DISCONTINUED | OUTPATIENT
Start: 2022-11-17 | End: 2022-11-17 | Stop reason: SURG

## 2022-11-17 RX ORDER — NICOTINE POLACRILEX 4 MG
15 LOZENGE BUCCAL
Status: DISCONTINUED | OUTPATIENT
Start: 2022-11-17 | End: 2022-11-17 | Stop reason: HOSPADM

## 2022-11-17 RX ORDER — HYDROCODONE BITARTRATE AND ACETAMINOPHEN 5; 325 MG/1; MG/1
1 TABLET ORAL EVERY 4 HOURS PRN
Status: DISCONTINUED | OUTPATIENT
Start: 2022-11-17 | End: 2022-11-18

## 2022-11-17 RX ORDER — ONDANSETRON 2 MG/ML
INJECTION INTRAMUSCULAR; INTRAVENOUS AS NEEDED
Status: DISCONTINUED | OUTPATIENT
Start: 2022-11-17 | End: 2022-11-17 | Stop reason: SURG

## 2022-11-17 RX ORDER — HYDROMORPHONE HYDROCHLORIDE 1 MG/ML
0.6 INJECTION, SOLUTION INTRAMUSCULAR; INTRAVENOUS; SUBCUTANEOUS EVERY 5 MIN PRN
Status: DISCONTINUED | OUTPATIENT
Start: 2022-11-17 | End: 2022-11-17 | Stop reason: HOSPADM

## 2022-11-17 RX ORDER — ONDANSETRON 4 MG/1
4 TABLET, FILM COATED ORAL EVERY 8 HOURS PRN
Status: DISCONTINUED | OUTPATIENT
Start: 2022-11-17 | End: 2022-11-18

## 2022-11-17 RX ORDER — MELATONIN
100 DAILY
Status: DISCONTINUED | OUTPATIENT
Start: 2022-11-17 | End: 2022-11-18

## 2022-11-17 RX ORDER — DEXAMETHASONE SODIUM PHOSPHATE 4 MG/ML
VIAL (ML) INJECTION AS NEEDED
Status: DISCONTINUED | OUTPATIENT
Start: 2022-11-17 | End: 2022-11-17 | Stop reason: SURG

## 2022-11-17 RX ORDER — ACETAMINOPHEN 500 MG
1000 TABLET ORAL ONCE
Status: DISCONTINUED | OUTPATIENT
Start: 2022-11-17 | End: 2022-11-17 | Stop reason: HOSPADM

## 2022-11-17 RX ORDER — MULTIVITAMIN WITH IRON
100 TABLET ORAL DAILY
COMMUNITY

## 2022-11-17 RX ORDER — HYDROMORPHONE HYDROCHLORIDE 1 MG/ML
0.2 INJECTION, SOLUTION INTRAMUSCULAR; INTRAVENOUS; SUBCUTANEOUS EVERY 2 HOUR PRN
Status: DISCONTINUED | OUTPATIENT
Start: 2022-11-17 | End: 2022-11-18

## 2022-11-17 RX ORDER — HYDROMORPHONE HYDROCHLORIDE 1 MG/ML
INJECTION, SOLUTION INTRAMUSCULAR; INTRAVENOUS; SUBCUTANEOUS
Status: COMPLETED
Start: 2022-11-17 | End: 2022-11-17

## 2022-11-17 RX ORDER — NICOTINE 21 MG/24HR
1 PATCH, TRANSDERMAL 24 HOURS TRANSDERMAL DAILY
Status: DISCONTINUED | OUTPATIENT
Start: 2022-11-17 | End: 2022-11-18

## 2022-11-17 RX ORDER — GLYCOPYRROLATE 0.2 MG/ML
INJECTION, SOLUTION INTRAMUSCULAR; INTRAVENOUS AS NEEDED
Status: DISCONTINUED | OUTPATIENT
Start: 2022-11-17 | End: 2022-11-17 | Stop reason: SURG

## 2022-11-17 RX ORDER — DIPHENHYDRAMINE HYDROCHLORIDE 50 MG/ML
12.5 INJECTION INTRAMUSCULAR; INTRAVENOUS AS NEEDED
Status: DISCONTINUED | OUTPATIENT
Start: 2022-11-17 | End: 2022-11-17 | Stop reason: HOSPADM

## 2022-11-17 RX ORDER — GLYCOPYRROLATE 0.2 MG/ML
INJECTION, SOLUTION INTRAMUSCULAR; INTRAVENOUS
Status: COMPLETED
Start: 2022-11-17 | End: 2022-11-17

## 2022-11-17 RX ORDER — HYDROCODONE BITARTRATE AND ACETAMINOPHEN 5; 325 MG/1; MG/1
2 TABLET ORAL ONCE AS NEEDED
Status: DISCONTINUED | OUTPATIENT
Start: 2022-11-17 | End: 2022-11-17 | Stop reason: HOSPADM

## 2022-11-17 RX ORDER — NICOTINE POLACRILEX 4 MG
30 LOZENGE BUCCAL
Status: DISCONTINUED | OUTPATIENT
Start: 2022-11-17 | End: 2022-11-18

## 2022-11-17 RX ORDER — ATORVASTATIN CALCIUM 10 MG/1
10 TABLET, FILM COATED ORAL EVERY EVENING
Status: DISCONTINUED | OUTPATIENT
Start: 2022-11-17 | End: 2022-11-18

## 2022-11-17 RX ORDER — TRAMADOL HYDROCHLORIDE 50 MG/1
50 TABLET ORAL EVERY 6 HOURS PRN
Status: DISCONTINUED | OUTPATIENT
Start: 2022-11-17 | End: 2022-11-18

## 2022-11-17 RX ORDER — BUPIVACAINE HYDROCHLORIDE AND EPINEPHRINE 2.5; 5 MG/ML; UG/ML
INJECTION, SOLUTION EPIDURAL; INFILTRATION; INTRACAUDAL; PERINEURAL AS NEEDED
Status: DISCONTINUED | OUTPATIENT
Start: 2022-11-17 | End: 2022-11-17 | Stop reason: HOSPADM

## 2022-11-17 RX ORDER — SODIUM CHLORIDE, SODIUM LACTATE, POTASSIUM CHLORIDE, CALCIUM CHLORIDE 600; 310; 30; 20 MG/100ML; MG/100ML; MG/100ML; MG/100ML
INJECTION, SOLUTION INTRAVENOUS CONTINUOUS
Status: DISCONTINUED | OUTPATIENT
Start: 2022-11-17 | End: 2022-11-17 | Stop reason: HOSPADM

## 2022-11-17 RX ORDER — GLIMEPIRIDE 2 MG/1
2 TABLET ORAL
Status: DISCONTINUED | OUTPATIENT
Start: 2022-11-18 | End: 2022-11-17

## 2022-11-17 RX ORDER — ONDANSETRON 2 MG/ML
4 INJECTION INTRAMUSCULAR; INTRAVENOUS EVERY 6 HOURS PRN
Status: DISCONTINUED | OUTPATIENT
Start: 2022-11-17 | End: 2022-11-17 | Stop reason: HOSPADM

## 2022-11-17 RX ORDER — IBUPROFEN 600 MG/1
600 TABLET ORAL EVERY 6 HOURS SCHEDULED
Status: DISCONTINUED | OUTPATIENT
Start: 2022-11-18 | End: 2022-11-18

## 2022-11-17 RX ORDER — HYDROCODONE BITARTRATE AND ACETAMINOPHEN 5; 325 MG/1; MG/1
1 TABLET ORAL ONCE AS NEEDED
Status: DISCONTINUED | OUTPATIENT
Start: 2022-11-17 | End: 2022-11-17 | Stop reason: HOSPADM

## 2022-11-17 RX ORDER — ONDANSETRON 2 MG/ML
4 INJECTION INTRAMUSCULAR; INTRAVENOUS EVERY 8 HOURS PRN
Status: DISCONTINUED | OUTPATIENT
Start: 2022-11-17 | End: 2022-11-18

## 2022-11-17 RX ORDER — METOCLOPRAMIDE HYDROCHLORIDE 5 MG/ML
10 INJECTION INTRAMUSCULAR; INTRAVENOUS EVERY 8 HOURS PRN
Status: DISCONTINUED | OUTPATIENT
Start: 2022-11-17 | End: 2022-11-17 | Stop reason: HOSPADM

## 2022-11-17 RX ORDER — HYDROMORPHONE HYDROCHLORIDE 1 MG/ML
0.4 INJECTION, SOLUTION INTRAMUSCULAR; INTRAVENOUS; SUBCUTANEOUS EVERY 5 MIN PRN
Status: DISCONTINUED | OUTPATIENT
Start: 2022-11-17 | End: 2022-11-17 | Stop reason: HOSPADM

## 2022-11-17 RX ORDER — DEXTROSE MONOHYDRATE 25 G/50ML
50 INJECTION, SOLUTION INTRAVENOUS
Status: DISCONTINUED | OUTPATIENT
Start: 2022-11-17 | End: 2022-11-17 | Stop reason: HOSPADM

## 2022-11-17 RX ORDER — MIDAZOLAM HYDROCHLORIDE 1 MG/ML
1 INJECTION INTRAMUSCULAR; INTRAVENOUS EVERY 5 MIN PRN
Status: DISCONTINUED | OUTPATIENT
Start: 2022-11-17 | End: 2022-11-17 | Stop reason: HOSPADM

## 2022-11-17 RX ORDER — ROCURONIUM BROMIDE 10 MG/ML
INJECTION, SOLUTION INTRAVENOUS AS NEEDED
Status: DISCONTINUED | OUTPATIENT
Start: 2022-11-17 | End: 2022-11-17 | Stop reason: SURG

## 2022-11-17 RX ORDER — HYDROMORPHONE HYDROCHLORIDE 1 MG/ML
0.8 INJECTION, SOLUTION INTRAMUSCULAR; INTRAVENOUS; SUBCUTANEOUS EVERY 2 HOUR PRN
Status: DISCONTINUED | OUTPATIENT
Start: 2022-11-17 | End: 2022-11-18

## 2022-11-17 RX ORDER — HYDROMORPHONE HYDROCHLORIDE 1 MG/ML
0.4 INJECTION, SOLUTION INTRAMUSCULAR; INTRAVENOUS; SUBCUTANEOUS EVERY 2 HOUR PRN
Status: DISCONTINUED | OUTPATIENT
Start: 2022-11-17 | End: 2022-11-18

## 2022-11-17 RX ORDER — METOCLOPRAMIDE HYDROCHLORIDE 5 MG/ML
INJECTION INTRAMUSCULAR; INTRAVENOUS
Status: COMPLETED
Start: 2022-11-17 | End: 2022-11-17

## 2022-11-17 RX ORDER — DIPHENHYDRAMINE HYDROCHLORIDE 50 MG/ML
INJECTION INTRAMUSCULAR; INTRAVENOUS AS NEEDED
Status: DISCONTINUED | OUTPATIENT
Start: 2022-11-17 | End: 2022-11-17 | Stop reason: SURG

## 2022-11-17 RX ORDER — DIMENHYDRINATE 50 MG
100 TABLET ORAL DAILY
COMMUNITY

## 2022-11-17 RX ADMIN — DIPHENHYDRAMINE HYDROCHLORIDE 12.5 MG: 50 INJECTION INTRAMUSCULAR; INTRAVENOUS at 09:07:00

## 2022-11-17 RX ADMIN — ROCURONIUM BROMIDE 40 MG: 10 INJECTION, SOLUTION INTRAVENOUS at 09:07:00

## 2022-11-17 RX ADMIN — SODIUM CHLORIDE, SODIUM LACTATE, POTASSIUM CHLORIDE, CALCIUM CHLORIDE: 600; 310; 30; 20 INJECTION, SOLUTION INTRAVENOUS at 09:40:00

## 2022-11-17 RX ADMIN — GLYCOPYRROLATE 0.4 MG: 0.2 INJECTION, SOLUTION INTRAMUSCULAR; INTRAVENOUS at 10:47:00

## 2022-11-17 RX ADMIN — CEFAZOLIN SODIUM/WATER 2 G: 2 G/20 ML SYRINGE (ML) INTRAVENOUS at 09:11:00

## 2022-11-17 RX ADMIN — ROCURONIUM BROMIDE 10 MG: 10 INJECTION, SOLUTION INTRAVENOUS at 09:35:00

## 2022-11-17 RX ADMIN — KETOROLAC TROMETHAMINE 30 MG: 30 INJECTION, SOLUTION INTRAMUSCULAR; INTRAVENOUS at 10:29:00

## 2022-11-17 RX ADMIN — NEOSTIGMINE METHYLSULFATE 2 MG: 1 INJECTION, SOLUTION INTRAVENOUS at 10:47:00

## 2022-11-17 RX ADMIN — DEXAMETHASONE SODIUM PHOSPHATE 4 MG: 4 MG/ML VIAL (ML) INJECTION at 09:07:00

## 2022-11-17 RX ADMIN — ONDANSETRON 4 MG: 2 INJECTION INTRAMUSCULAR; INTRAVENOUS at 10:29:00

## 2022-11-17 RX ADMIN — LIDOCAINE HYDROCHLORIDE 50 MG: 10 INJECTION, SOLUTION EPIDURAL; INFILTRATION; INTRACAUDAL; PERINEURAL at 09:07:00

## 2022-11-17 NOTE — ANESTHESIA PROCEDURE NOTES
Airway  Date/Time: 11/17/2022 9:10 AM  Urgency: elective      General Information and Staff    Patient location during procedure: OR  Anesthesiologist: Lalito Caban MD  Performed: anesthesiologist     Indications and Patient Condition  Indications for airway management: anesthesia  Sedation level: deep  Preoxygenated: yes  Patient position: sniffing  Mask difficulty assessment: 0 - not attempted    Final Airway Details  Final airway type: endotracheal airway      Successful airway: ETT  Cuffed: yes   Successful intubation technique: direct laryngoscopy  Endotracheal tube insertion site: oral  Blade: Saúl  Blade size: #3  ETT size (mm): 7.0    Cormack-Lehane Classification: grade IIA - partial view of glottis  Placement verified by: chest auscultation and capnometry   Measured from: lips  ETT to lips (cm): 20  Number of attempts at approach: 1

## 2022-11-17 NOTE — OPERATIVE REPORT
PRE-OP DIAGNOSIS:  Uterovaginal prolapse; stress incontinence    POST-OP DIAGNOSIS:  Same    PROCEDURE:  Repair of enterocele; uterosacral ligament suspension; anterior colporrhaphy; posterior colporrhaphy; midurethral sling; cystourethroscopy    SURGEON:  David Nur DO    ASSISTANT:  Weeks    ANESTHESIA:  General    EBL:  100 ml  UOP: 500 mL    Findings: bilateral ureteral efflux without evidence of bladder, ureteral, or urethral injury. Hemostasis. DESCRIPTION OF PROCEDURE:   The patient remained in dorsal lithotomy position prepped and draped under general anesthesia after Dr. Dafne Sanchez completed the vaginal hysterectomy. Due to the presence of significant apical prolapse and associated enterocele, it was necessary to proceed with suspension of the vaginal apex and enterocele repair. A lap sponge was placed into the peritoneal cavity and the uterosacral ligaments identified. A Gil stitch was placed and tagged to repair the enterocele. This suture was placed through the posterior vaginal wall, through the left uterosacral ligament, across the anterior rectum, through the right uterosacral ligament and then back through the posterior vaginal wall. Two uterosacral suspension stitches were then placed on each side of the pelvis. These were placed high on the uterosacral ligaments at and just proximal to the ischial spine. Uterosacral suspension stitches were then placed using 1-0 vicryl sutures on each side of the pelvis for apical suspension. Once these sutures were placed, the urethra was dilated to accommodate a 23 Ivorian caliber cystoscope sheath and cystoscopy was undertaken. Cystoscopy confirmed that there had been no injury to the bladder. Urine was seen from both ureteral orifices confirming that the ureters were patent. Allis clamps were used to grasp the redundant anterior vaginal epithelium in the midline and a midline incision was made after infiltration of .25% marcaine with epinepherine. The redundant epithelium was dissected from the underlying endopelvic connective tissue of the bladder and 0 Vicryl suture was then used to plicate the endopelvic connective tissue, obliterating the cystocele. The excess vaginal epithelium was excised and the midline incision was then closed with 2-0 Vicryl suture in running locking fashion. The lap sponge was removed from the peritoneal cavity and the vaginal apex was then closed with 2-0 Vicryl suture in a running locking fashion. The uterosacral sutures were then tied down resulting in satisfactory elevation of the vaginal apex. Allis clamps were then used to grasp the vaginal epithelium suburethrally and a scalpel was used to make the midline incision after infiltration of .25% marcaine with epinepherine. Sharp dissection with Metzenbaum scissors was performed to dissect the periurethral tissues towards the pubic rami bilaterally. The bladder was emptied, the Advantage Fit trocars were then used to place the guide sleeves into the retropubic space. Once the sleeves were in position, cystoscopy was undertaken. No bladder, urethral, or ureteral injuries identified. Urine seen from both UOs. The sleeves were then brought through the suprapubic skin, bringing the mesh into position at the level of the midurethra. Care was taken to avoid placing tension on the urethra as the covering sheath was removed from the mesh. The excess mesh was cut away at the suprapubic skin and those incisions closed with skin glue. The vaginal incision was then closed with 2-0 Vicryl suture in a running locking fashion. Attention was then directed to the posterior compartment. The redundant posterior vaginal epithelium and perineal skin was excised after infiltration of .25% marcaine with epinepherine. 0 Vicryl suture was then used to plicate the rectovaginal connective tissue, obliterating the rectocele.   Additional sutures of 0 Vicryl were utilized to reconstruct the perineal body. Rectal examination confirmed that none of these sutures had impinged the rectum. 2-0 Vicryl sutures were then used to reapproximate the vaginal epithelium in the midline in a running locking fashion. Inspection at this point revealed a well-supported vaginal apex anterior and posterior compartments, with good hemostasis. A Lopez catheter was placed transurethrally. The patient was then removed from the dorsal lithotomy position, awakened and extubated and transferred from the operating room to the recovery room in stable condition, having tolerated the procedure well. Sponge, lap, & needle counts were correct.

## 2022-11-17 NOTE — INTERVAL H&P NOTE
Pre-op Diagnosis: STRESS INCONTINENCE    The above referenced H&P was reviewed by Cassy Leach MD on 11/17/2022, the patient was examined and no significant changes have occurred in the patient's condition since the H&P was performed. I discussed with the patient and/or legal representative the potential benefits, risks and side effects of this procedure; the likelihood of the patient achieving goals; and potential problems that might occur during recuperation. I discussed reasonable alternatives to the procedure, including risks, benefits and side effects related to the alternatives and risks related to not receiving this procedure. We will proceed with procedure as planned.

## 2022-11-17 NOTE — ANESTHESIA POSTPROCEDURE EVALUATION
[facility]    Janel Ott Patient Status:  Outpatient in a Bed   Age/Gender 71year old female MRN [id number]   [facility] Attending Vishal Funez MD   Hosp Day # 0 PCP Kali Thompson MD       Anesthesia Post-op Note    TOTAL VAGINAL HYSTERECTOMY  (WEEKS)     Procedure Summary     Date: 11/17/22 Room / Location: [facility] MAIN OR 17 / 140[facility] OR    Anesthesia Start: 3677 Anesthesia Stop:     Procedures:       TOTAL VAGINAL HYSTERECTOMY  (WEEKS) (Vagina )      UTEROSACRAL LIGAMENT SUSPENSION, ANTERIOR POSTERIOR ENTEROCELE REPAIR,  PLACEMENT OF MID URETHRAL SLING, CYSTOSCOPY (JIRSCHELE) (Vagina ) Diagnosis: (STRESS INCONTINENCE)    Surgeons: Vishal Funez MD; Harry Jeffers DO Anesthesiologist: Solo Macario MD    Anesthesia Type: general ASA Status: 2          Anesthesia Type: No value filed. Vitals Value Taken Time   /53 11/17/22 1104   Temp 99.0 11/17/22 1104   Pulse 72 11/17/22 1103   Resp 16 11/17/22 1104   SpO2 97 % 11/17/22 1103   Vitals shown include unvalidated device data. Patient Location: PACU    Anesthesia Type: general    Airway Patency: extubated    Postop Pain Control: adequate    Mental Status: mildly sedated but able to meaningfully participate in the post-anesthesia evaluation    Nausea/Vomiting: none    Cardiopulmonary/Hydration status: stable euvolemic    Complications: no apparent anesthesia related complications    Postop vital signs: stable    Dental Exam: Unchanged from Preop    Patient to be discharged from PACU when criteria met.

## 2022-11-17 NOTE — PROGRESS NOTES
Vss. Pt resting in bed. Pt on ra with 02 sats wnl. Lungs cta. Pt denies difficulty breathing or sob. Denies chest pain. Denies n/v. Tolerating water well, will advance diet per md order to regular. ivf infusing, site intact. Pt reports mild pain, denies need for pain medication at this time. Kasey pad in place with small amount of bloody drainage noted. New pad in place. 2 suprapubic lap sites c/d/i. scds in place. Pt reports she does smoke 6 cigarettes/day at home. Dr. Dwight Gong paged regarding nicotine patch and ordering hyperglycemic protocol. new orders received. Pt updated on poc. Will monitor. 1822: urine output for last 4 hours only 75ml of concentrated urine. Dr. Dwight Gong paged and new orders received for fluid bolus.

## 2022-11-18 VITALS
DIASTOLIC BLOOD PRESSURE: 47 MMHG | HEART RATE: 73 BPM | WEIGHT: 176.38 LBS | BODY MASS INDEX: 28.34 KG/M2 | HEIGHT: 66 IN | TEMPERATURE: 99 F | SYSTOLIC BLOOD PRESSURE: 133 MMHG | OXYGEN SATURATION: 95 % | RESPIRATION RATE: 18 BRPM

## 2022-11-18 LAB
BASOPHILS # BLD AUTO: 0.03 X10(3) UL (ref 0–0.2)
BASOPHILS NFR BLD AUTO: 0.4 %
EOSINOPHIL # BLD AUTO: 0.13 X10(3) UL (ref 0–0.7)
EOSINOPHIL NFR BLD AUTO: 1.6 %
ERYTHROCYTE [DISTWIDTH] IN BLOOD BY AUTOMATED COUNT: 12.1 %
EST. AVERAGE GLUCOSE BLD GHB EST-MCNC: 137 MG/DL (ref 68–126)
GLUCOSE BLD-MCNC: 112 MG/DL (ref 70–99)
GLUCOSE BLD-MCNC: 196 MG/DL (ref 70–99)
HBA1C MFR BLD: 6.4 % (ref ?–5.7)
HCT VFR BLD AUTO: 32.5 %
HGB BLD-MCNC: 11.3 G/DL
IMM GRANULOCYTES # BLD AUTO: 0.03 X10(3) UL (ref 0–1)
IMM GRANULOCYTES NFR BLD: 0.4 %
LYMPHOCYTES # BLD AUTO: 2.18 X10(3) UL (ref 1–4)
LYMPHOCYTES NFR BLD AUTO: 27 %
MCH RBC QN AUTO: 30.8 PG (ref 26–34)
MCHC RBC AUTO-ENTMCNC: 34.8 G/DL (ref 31–37)
MCV RBC AUTO: 88.6 FL
MONOCYTES # BLD AUTO: 0.67 X10(3) UL (ref 0.1–1)
MONOCYTES NFR BLD AUTO: 8.3 %
NEUTROPHILS # BLD AUTO: 5.03 X10 (3) UL (ref 1.5–7.7)
NEUTROPHILS # BLD AUTO: 5.03 X10(3) UL (ref 1.5–7.7)
NEUTROPHILS NFR BLD AUTO: 62.3 %
PLATELET # BLD AUTO: 172 10(3)UL (ref 150–450)
RBC # BLD AUTO: 3.67 X10(6)UL
WBC # BLD AUTO: 8.1 X10(3) UL (ref 4–11)

## 2022-11-18 PROCEDURE — 83036 HEMOGLOBIN GLYCOSYLATED A1C: CPT | Performed by: OBSTETRICS & GYNECOLOGY

## 2022-11-18 PROCEDURE — 82962 GLUCOSE BLOOD TEST: CPT

## 2022-11-18 PROCEDURE — 85025 COMPLETE CBC W/AUTO DIFF WBC: CPT | Performed by: OBSTETRICS & GYNECOLOGY

## 2022-11-18 RX ORDER — HYDROCODONE BITARTRATE AND ACETAMINOPHEN 5; 325 MG/1; MG/1
1 TABLET ORAL EVERY 6 HOURS PRN
Qty: 10 TABLET | Refills: 0 | Status: SHIPPED | OUTPATIENT
Start: 2022-11-18

## 2022-11-18 RX ORDER — IBUPROFEN 600 MG/1
600 TABLET ORAL EVERY 6 HOURS PRN
Qty: 50 TABLET | Refills: 2 | Status: SHIPPED | OUTPATIENT
Start: 2022-11-18

## 2022-11-18 NOTE — PROGRESS NOTES
NURSING DISCHARGE NOTE    Discharged Home via Wheelchair. Accompanied by Family member  Belongings Taken by patient/family. Patient left unit via wheelchair with daughter at 1. Patient discharge education provided regarding saldaña catheter care, prescriptions, and follow-up appointments. IV removed. Standard drainage bag switched to leg bag. Patient discharged in stable condition.

## 2022-11-18 NOTE — PLAN OF CARE
Pt vitals stable, A&O x4. Continuous pulse ox in place. Pt denied chest pain, shortness of breath, and calf pain. Lopez intact and draining. IVF infusing. Laps x2 to suprapubic area. Minimal vaginal bleeding. Tolerating diet. Bed locked in low position, call light in reach, rounding provided. Problem: PAIN - ADULT  Goal: Verbalizes/displays adequate comfort level or patient's stated pain goal  Description: INTERVENTIONS:  - Encourage pt to monitor pain and request assistance  - Assess pain using appropriate pain scale  - Administer analgesics based on type and severity of pain and evaluate response  - Implement non-pharmacological measures as appropriate and evaluate response  - Consider cultural and social influences on pain and pain management  - Manage/alleviate anxiety  - Utilize distraction and/or relaxation techniques  - Monitor for opioid side effects  - Notify MD/LIP if interventions unsuccessful or patient reports new pain  - Anticipate increased pain with activity and pre-medicate as appropriate  Outcome: Progressing     Problem: RISK FOR INFECTION - ADULT  Goal: Absence of fever/infection during anticipated neutropenic period  Description: INTERVENTIONS  - Monitor WBC  - Administer growth factors as ordered  - Implement neutropenic guidelines  Outcome: Progressing     Problem: SAFETY ADULT - FALL  Goal: Free from fall injury  Description: INTERVENTIONS:  - Assess pt frequently for physical needs  - Identify cognitive and physical deficits and behaviors that affect risk of falls.   - [place] fall precautions as indicated by assessment.  - Educate pt/family on patient safety including physical limitations  - Instruct pt to call for assistance with activity based on assessment  - Modify environment to reduce risk of injury  - Provide assistive devices as appropriate  - Consider OT/PT consult to assist with strengthening/mobility  - Encourage toileting schedule  Outcome: Progressing     Problem: DISCHARGE PLANNING  Goal: Discharge to home or other facility with appropriate resources  Description: INTERVENTIONS:  - Identify barriers to discharge w/pt and caregiver  - Include patient/family/discharge partner in discharge planning  - Arrange for needed discharge resources and transportation as appropriate  - Identify discharge learning needs (meds, wound care, etc)  - Arrange for interpreters to assist at discharge as needed  - Consider post-discharge preferences of patient/family/discharge partner  - Complete POLST form as appropriate  - Assess patient's ability to be responsible for managing their own health  - Refer to Case Management Department for coordinating discharge planning if the patient needs post-hospital services based on physician/LIP order or complex needs related to functional status, cognitive ability or social support system  Outcome: Progressing

## 2022-11-18 NOTE — OPERATIVE REPORT
Nallely Preciado Patient Status:  Outpatient in a Bed    1950 MRN [id number]   [facility] 3NW-A Attending Theresa Sandoval MD   Hosp Day # 0 PCP Alan Raymundo MD       OPERATIVE REPORT         PREOPERATIVE DIAGNOSIS:  Uterovaginal prolapse     POSTOPERATIVE DIAGNOSIS:  same    ATTENDING PHYSICIAN(s): Cherri/Germain     PROCEDURE PERFORMED: Total vaginal hysterectomy, anterior and posterior repairs, Mid-urethral sling, uterosacral ligament vaginal vault suspension, and cystoscopy. Note, this dictation includes the hysterectomy and salpingectomies. Repairs, suspension, and cystoscopy are dictated elsewhere by Dr. Marietta High. ANESTHESIA: General.   FINDINGS: The cervix was normal. The uterus was normal size. The ovaries were normal bilaterally as were the tubes. TECHNIQUE: The patient was prepped and draped in the sterile fashion after satisfactory general anesthesia was given. The bladder was catheterized. A weighted speculum was placed in the posterior vaginal vault and the anterior lip of the cervix was grasped with 2 tenaculums. A circumferential incision was made around the cervix with Bovie cautery. The anterior compartment was entered with sharp dissection, placing an anterior retractor to protect the bladder. The bladder pillars were cauterized and dissected. The posterior compartment was then dissected as well and entry into the cul-de-sac was obtained. A posterior retractor was placed. The uterosacral ligaments were clamped bilaterally, cut, ligated with 0 Vicryl suture and held for future use. The uterine arteries and veins on each side were clamped, cut, and ligated transfixed with sutures of 0 Vicryl. The remaining cardinal ligament was taken in 2 pedicles. The uterine ovarian ligaments were then clamped, cut, and the specimen was removed. These pedicles were ligated with transfixing sutures of 0 Vicryl. The ovaries were noted to be normal bilaterally.  The fallopian tubes were not seen as they were very high in the pelvis. The posterior cuff was reefed with running 2-0 Vicryl suture. Additional bleeders were sewn with 0 Vicryl sutures until hemostasis was excellent. At this point, the repairs were begun as dictated by Dr. Adri Morrison. At the termination of all procedures, the ureters were noted to be functioning well bilaterally. A Lopez catheter was placed and the patient was returned to the supine position. She tolerated all procedures well with an estimated blood loss of 100 mL and went to recovery in good condition.

## 2022-11-19 ENCOUNTER — TELEPHONE (OUTPATIENT)
Dept: UROLOGY | Facility: HOSPITAL | Age: 72
End: 2022-11-19

## 2022-11-19 NOTE — TELEPHONE ENCOUNTER
TC to pt following surgery  Doing well  Denies CP or SOB  C/o some rectal discomfort   Not taking pain meds regularly   Reviewed PO pain control schedule w/ IBP & norco  Catheter in place draining freely  Awaiting BM  No heavy vaginal bleeding, some spotting  Tolerates ambulation, PO diet  Reviewed bowel mgmt and activity restrictions  All questions answered  Encouraged her to call with questions or concerns  Follow up as scheduled  She understands and agrees to plan

## 2022-11-23 ENCOUNTER — OFFICE VISIT (OUTPATIENT)
Dept: UROLOGY | Facility: CLINIC | Age: 72
End: 2022-11-23
Attending: OBSTETRICS & GYNECOLOGY
Payer: MEDICARE

## 2022-11-23 VITALS — BODY MASS INDEX: 28.28 KG/M2 | WEIGHT: 176 LBS | TEMPERATURE: 98 F | HEIGHT: 66 IN

## 2022-11-23 DIAGNOSIS — R33.8 POSTOPERATIVE URINARY RETENTION: Primary | ICD-10-CM

## 2022-11-23 DIAGNOSIS — N99.89 POSTOPERATIVE URINARY RETENTION: Primary | ICD-10-CM

## 2022-11-23 DIAGNOSIS — Z98.890 POST-OPERATIVE STATE: ICD-10-CM

## 2022-11-23 PROCEDURE — 51702 INSERT TEMP BLADDER CATH: CPT

## 2022-11-23 PROCEDURE — 99212 OFFICE O/P EST SF 10 MIN: CPT

## 2022-11-23 NOTE — PATIENT INSTRUCTIONS
Catheter care:  Please empty your bladder every 2-3 hours during the day using the flip flow valve as demonstrated. You can attach the end of your catheter to the Lopez bag an night to rest.  Be sure the flip flow valve is in the open position once Lopez bag is attached and you can see urine flowing into the bag. You can use the alcohol wipes on the tip of the catheter and the tubing to make it easier to attach. Bowel care:  Begin fiber and Milk of Magnesia (MOM) today. Take MOM this morning and repeat tonight if no BM. Continue taking MOM twice daily until you have BM, then can decrease to once daily MOM. Take Fiber every day. If no BM by Thursday morning, use a glycerin suppository in addition to MOM to produce BM. Continue pain meds as needed. Please call the service over the weekend if you have any issues at [phone].

## 2022-11-24 ENCOUNTER — TELEPHONE (OUTPATIENT)
Dept: UROLOGY | Facility: HOSPITAL | Age: 72
End: 2022-11-24

## 2022-11-24 RX ORDER — NITROFURANTOIN 25; 75 MG/1; MG/1
100 CAPSULE ORAL 2 TIMES DAILY
Qty: 14 CAPSULE | Refills: 0 | Status: SHIPPED | OUTPATIENT
Start: 2022-11-24 | End: 2022-12-01

## 2022-11-25 NOTE — TELEPHONE ENCOUNTER
Patient called answering service reporting blood in her urine. She failed voiding trial yesterday and was sent home with Lopez and flip flow. Noticed \"pinkinsh tinged urine\". Also reports diarrhea after taking MOM and Benefiber. Recommend to back down from laxatives. Rx sent to pharmacy for  mg PO bid x 7 days to cover for possible UTI. The patient verbalized understanding.      Osbaldo Devlin MD, Tracy Dey  Female Pelvic Medicine and  Reconstructive Surgery (Urogynecology)  [phone] (Pager)

## 2022-11-29 NOTE — OPERATIVE REPORT
Seth Herrera Patient Status:  Outpatient in a Bed    1950 MRN [id number]   [facility] 3NW-A Attending No att. providers found   [address] Day # 0 PCP Ranjana Montalvo MD       OPERATIVE REPORT         PREOPERATIVE DIAGNOSIS:  Uterovaginal prolapse     POSTOPERATIVE DIAGNOSIS:  same    ATTENDING PHYSICIAN(s): Cherri/Germain     PROCEDURE PERFORMED: Total vaginal hysterectomy, bilateral salpingectomy, anterior and posterior repairs, Mid-urethral sling, uterosacral ligament vaginal vault suspension, and cystoscopy. Note, this dictation includes the hysterectomy and salpingectomies. Repairs, suspension, and cystoscopy are dictated elsewhere by Dr. Manoj Barrios. ANESTHESIA: General.   FINDINGS: The cervix was normal. The uterus was normal size. The ovaries were normal bilaterally as were the tubes. TECHNIQUE: The patient was prepped and draped in the sterile fashion after satisfactory general anesthesia was given. The bladder was catheterized. A weighted speculum was placed in the posterior vaginal vault and the anterior lip of the cervix was grasped with 2 tenaculums. A circumferential incision was made around the cervix with Bovie cautery. The anterior compartment was entered with sharp dissection, placing an anterior retractor to protect the bladder. The bladder pillars were cauterized and dissected. The posterior compartment was then dissected as well and entry into the cul-de-sac was obtained. A posterior retractor was placed. The uterosacral ligaments were clamped bilaterally, cut, ligated with 0 Vicryl suture and held for future use. The uterine arteries and veins on each side were clamped, cut, and ligated transfixed with sutures of 0 Vicryl. The remaining cardinal ligament was taken in 2 pedicles. The uterine ovarian ligaments were then clamped, cut, and the specimen was removed. These pedicles were ligated with transfixing sutures of 0 Vicryl. The ovaries were noted to be normal bilaterally. The fallopian tubes were grasped, traced to their origin, and clamped with right angle clamps. The tubes were removed and the pedicles were ligated with 0 Vicryl. The posterior cuff was reefed with running 2-0 Vicryl suture. Additional bleeders were sewn with 0 Vicryl sutures until hemostasis was excellent. At this point, the repairs were begun as dictated by Dr. Shirley Staples. At the termination of all procedures, the ureters were noted to be functioning well bilaterally. A Lopez catheter was placed and the patient was returned to the supine position. She tolerated all procedures well with an estimated blood loss of 100 mL and went to recovery in good condition.

## 2022-11-30 ENCOUNTER — OFFICE VISIT (OUTPATIENT)
Dept: UROLOGY | Facility: CLINIC | Age: 72
End: 2022-11-30
Attending: OBSTETRICS & GYNECOLOGY
Payer: MEDICARE

## 2022-11-30 VITALS — HEIGHT: 66 IN | WEIGHT: 176 LBS | TEMPERATURE: 98 F | BODY MASS INDEX: 28.28 KG/M2

## 2022-11-30 DIAGNOSIS — R33.8 POSTOPERATIVE RETENTION OF URINE: Primary | ICD-10-CM

## 2022-11-30 DIAGNOSIS — N99.89 POSTOPERATIVE RETENTION OF URINE: Primary | ICD-10-CM

## 2022-11-30 PROCEDURE — 99212 OFFICE O/P EST SF 10 MIN: CPT

## 2022-11-30 NOTE — PROGRESS NOTES
Addendum:  Pt called office to inform staff she's voiding well every 2hours and feels like she is fully emptying.

## 2022-12-06 ENCOUNTER — OFFICE VISIT (OUTPATIENT)
Dept: UROLOGY | Facility: CLINIC | Age: 72
End: 2022-12-06
Attending: OBSTETRICS & GYNECOLOGY
Payer: MEDICARE

## 2022-12-06 VITALS — BODY MASS INDEX: 28.28 KG/M2 | HEIGHT: 66 IN | TEMPERATURE: 98 F | WEIGHT: 176 LBS

## 2022-12-06 DIAGNOSIS — Z98.890 POST-OPERATIVE STATE: Primary | ICD-10-CM

## 2022-12-06 PROCEDURE — 99212 OFFICE O/P EST SF 10 MIN: CPT

## 2023-02-14 ENCOUNTER — OFFICE VISIT (OUTPATIENT)
Dept: UROLOGY | Facility: CLINIC | Age: 73
End: 2023-02-14
Attending: OBSTETRICS & GYNECOLOGY
Payer: MEDICARE

## 2023-02-14 VITALS — TEMPERATURE: 98 F | WEIGHT: 176 LBS | HEIGHT: 66 IN | BODY MASS INDEX: 28.28 KG/M2

## 2023-02-14 DIAGNOSIS — Z98.890 POST-OPERATIVE STATE: ICD-10-CM

## 2023-02-14 DIAGNOSIS — N81.84 PELVIC MUSCLE WASTING: ICD-10-CM

## 2023-02-14 DIAGNOSIS — N32.81 DETRUSOR INSTABILITY: Primary | ICD-10-CM

## 2023-02-14 DIAGNOSIS — N95.2 POSTMENOPAUSAL ATROPHIC VAGINITIS: ICD-10-CM

## 2023-02-14 PROCEDURE — 99212 OFFICE O/P EST SF 10 MIN: CPT

## 2023-02-14 RX ORDER — ESTRADIOL 0.1 MG/G
CREAM VAGINAL
Qty: 42 G | Refills: 3 | Status: SHIPPED | OUTPATIENT
Start: 2023-02-14

## 2023-11-15 ENCOUNTER — TELEPHONE (OUTPATIENT)
Dept: UROLOGY | Facility: CLINIC | Age: 73
End: 2023-11-15

## 2023-11-15 NOTE — TELEPHONE ENCOUNTER
Spoke with patient to remind of Yearly appointment on 11/21. Patient confirmed date and time of appointment and was provided with clinic address.

## 2023-11-21 ENCOUNTER — OFFICE VISIT (OUTPATIENT)
Dept: UROLOGY | Facility: CLINIC | Age: 73
End: 2023-11-21
Attending: OBSTETRICS & GYNECOLOGY
Payer: MEDICARE

## 2023-11-21 VITALS
SYSTOLIC BLOOD PRESSURE: 134 MMHG | TEMPERATURE: 98 F | DIASTOLIC BLOOD PRESSURE: 68 MMHG | HEIGHT: 66 IN | WEIGHT: 176.63 LBS | BODY MASS INDEX: 28.39 KG/M2

## 2023-11-21 DIAGNOSIS — N95.2 POSTMENOPAUSAL ATROPHIC VAGINITIS: ICD-10-CM

## 2023-11-21 DIAGNOSIS — N81.84 PELVIC MUSCLE WASTING: Primary | ICD-10-CM

## 2023-11-21 DIAGNOSIS — N32.81 DETRUSOR INSTABILITY: ICD-10-CM

## 2023-11-21 DIAGNOSIS — Z98.890 POST-OPERATIVE STATE: ICD-10-CM

## 2023-11-21 PROCEDURE — 99212 OFFICE O/P EST SF 10 MIN: CPT

## 2024-09-04 PROCEDURE — 99285 EMERGENCY DEPT VISIT HI MDM: CPT

## 2024-09-05 ENCOUNTER — APPOINTMENT (OUTPATIENT)
Dept: CT IMAGING | Facility: HOSPITAL | Age: 74
End: 2024-09-05
Attending: EMERGENCY MEDICINE
Payer: MEDICARE

## 2024-09-05 ENCOUNTER — APPOINTMENT (OUTPATIENT)
Dept: GENERAL RADIOLOGY | Facility: HOSPITAL | Age: 74
End: 2024-09-05
Attending: EMERGENCY MEDICINE
Payer: MEDICARE

## 2024-09-05 ENCOUNTER — HOSPITAL ENCOUNTER (INPATIENT)
Facility: HOSPITAL | Age: 74
LOS: 3 days | Discharge: HOME | End: 2024-09-08
Attending: EMERGENCY MEDICINE | Admitting: INTERNAL MEDICINE
Payer: MEDICARE

## 2024-09-05 DIAGNOSIS — K56.609 SMALL BOWEL OBSTRUCTION (HCC): Primary | ICD-10-CM

## 2024-09-05 LAB
ALBUMIN SERPL-MCNC: 4.6 G/DL (ref 3.2–4.8)
ALBUMIN/GLOB SERPL: 1.9 {RATIO} (ref 1–2)
ALP LIVER SERPL-CCNC: 65 U/L
ALT SERPL-CCNC: 17 U/L
ANION GAP SERPL CALC-SCNC: 10 MMOL/L (ref 0–18)
AST SERPL-CCNC: 26 U/L (ref ?–34)
BASOPHILS # BLD AUTO: 0.05 X10(3) UL (ref 0–0.2)
BASOPHILS NFR BLD AUTO: 0.4 %
BILIRUB SERPL-MCNC: 0.4 MG/DL (ref 0.2–1.1)
BUN BLD-MCNC: 16 MG/DL (ref 9–23)
CALCIUM BLD-MCNC: 10.3 MG/DL (ref 8.7–10.4)
CHLORIDE SERPL-SCNC: 104 MMOL/L (ref 98–112)
CO2 SERPL-SCNC: 26 MMOL/L (ref 21–32)
CREAT BLD-MCNC: 0.89 MG/DL
EGFRCR SERPLBLD CKD-EPI 2021: 68 ML/MIN/1.73M2 (ref 60–?)
EOSINOPHIL # BLD AUTO: 0.45 X10(3) UL (ref 0–0.7)
EOSINOPHIL NFR BLD AUTO: 3.8 %
ERYTHROCYTE [DISTWIDTH] IN BLOOD BY AUTOMATED COUNT: 12.7 %
GLOBULIN PLAS-MCNC: 2.4 G/DL (ref 2–3.5)
GLUCOSE BLD-MCNC: 152 MG/DL (ref 70–99)
GLUCOSE BLD-MCNC: 211 MG/DL (ref 70–99)
GLUCOSE BLD-MCNC: 73 MG/DL (ref 70–99)
GLUCOSE BLD-MCNC: 90 MG/DL (ref 70–99)
HCT VFR BLD AUTO: 40 %
HGB BLD-MCNC: 13.8 G/DL
IMM GRANULOCYTES # BLD AUTO: 0.02 X10(3) UL (ref 0–1)
IMM GRANULOCYTES NFR BLD: 0.2 %
LIPASE SERPL-CCNC: 38 U/L (ref 12–53)
LYMPHOCYTES # BLD AUTO: 1.73 X10(3) UL (ref 1–4)
LYMPHOCYTES NFR BLD AUTO: 14.7 %
MCH RBC QN AUTO: 29.9 PG (ref 26–34)
MCHC RBC AUTO-ENTMCNC: 34.5 G/DL (ref 31–37)
MCV RBC AUTO: 86.6 FL
MONOCYTES # BLD AUTO: 0.64 X10(3) UL (ref 0.1–1)
MONOCYTES NFR BLD AUTO: 5.4 %
NEUTROPHILS # BLD AUTO: 8.86 X10 (3) UL (ref 1.5–7.7)
NEUTROPHILS # BLD AUTO: 8.86 X10(3) UL (ref 1.5–7.7)
NEUTROPHILS NFR BLD AUTO: 75.5 %
OSMOLALITY SERPL CALC.SUM OF ELEC: 297 MOSM/KG (ref 275–295)
PLATELET # BLD AUTO: 238 10(3)UL (ref 150–450)
POTASSIUM SERPL-SCNC: 4.2 MMOL/L (ref 3.5–5.1)
PROT SERPL-MCNC: 7 G/DL (ref 5.7–8.2)
RBC # BLD AUTO: 4.62 X10(6)UL
SODIUM SERPL-SCNC: 140 MMOL/L (ref 136–145)
WBC # BLD AUTO: 11.8 X10(3) UL (ref 4–11)

## 2024-09-05 PROCEDURE — 83690 ASSAY OF LIPASE: CPT

## 2024-09-05 PROCEDURE — 96361 HYDRATE IV INFUSION ADD-ON: CPT

## 2024-09-05 PROCEDURE — 85025 COMPLETE CBC W/AUTO DIFF WBC: CPT | Performed by: EMERGENCY MEDICINE

## 2024-09-05 PROCEDURE — 80053 COMPREHEN METABOLIC PANEL: CPT

## 2024-09-05 PROCEDURE — 96376 TX/PRO/DX INJ SAME DRUG ADON: CPT

## 2024-09-05 PROCEDURE — 80053 COMPREHEN METABOLIC PANEL: CPT | Performed by: EMERGENCY MEDICINE

## 2024-09-05 PROCEDURE — 74177 CT ABD & PELVIS W/CONTRAST: CPT | Performed by: EMERGENCY MEDICINE

## 2024-09-05 PROCEDURE — 83690 ASSAY OF LIPASE: CPT | Performed by: EMERGENCY MEDICINE

## 2024-09-05 PROCEDURE — 85025 COMPLETE CBC W/AUTO DIFF WBC: CPT

## 2024-09-05 PROCEDURE — 96374 THER/PROPH/DIAG INJ IV PUSH: CPT

## 2024-09-05 PROCEDURE — 71045 X-RAY EXAM CHEST 1 VIEW: CPT | Performed by: EMERGENCY MEDICINE

## 2024-09-05 PROCEDURE — 82962 GLUCOSE BLOOD TEST: CPT

## 2024-09-05 RX ORDER — METOCLOPRAMIDE HYDROCHLORIDE 5 MG/ML
5 INJECTION INTRAMUSCULAR; INTRAVENOUS EVERY 8 HOURS PRN
Status: DISCONTINUED | OUTPATIENT
Start: 2024-09-05 | End: 2024-09-08

## 2024-09-05 RX ORDER — SODIUM CHLORIDE, SODIUM LACTATE, POTASSIUM CHLORIDE, CALCIUM CHLORIDE 600; 310; 30; 20 MG/100ML; MG/100ML; MG/100ML; MG/100ML
INJECTION, SOLUTION INTRAVENOUS CONTINUOUS
Status: DISCONTINUED | OUTPATIENT
Start: 2024-09-05 | End: 2024-09-08

## 2024-09-05 RX ORDER — DEXTROSE MONOHYDRATE 25 G/50ML
50 INJECTION, SOLUTION INTRAVENOUS
Status: DISCONTINUED | OUTPATIENT
Start: 2024-09-05 | End: 2024-09-08

## 2024-09-05 RX ORDER — HEPARIN SODIUM 5000 [USP'U]/ML
5000 INJECTION, SOLUTION INTRAVENOUS; SUBCUTANEOUS EVERY 8 HOURS SCHEDULED
Status: DISCONTINUED | OUTPATIENT
Start: 2024-09-05 | End: 2024-09-08

## 2024-09-05 RX ORDER — HYDROMORPHONE HYDROCHLORIDE 1 MG/ML
0.8 INJECTION, SOLUTION INTRAMUSCULAR; INTRAVENOUS; SUBCUTANEOUS EVERY 2 HOUR PRN
Status: DISCONTINUED | OUTPATIENT
Start: 2024-09-05 | End: 2024-09-08

## 2024-09-05 RX ORDER — NICOTINE POLACRILEX 4 MG
15 LOZENGE BUCCAL
Status: DISCONTINUED | OUTPATIENT
Start: 2024-09-05 | End: 2024-09-08

## 2024-09-05 RX ORDER — NICOTINE POLACRILEX 4 MG
30 LOZENGE BUCCAL
Status: DISCONTINUED | OUTPATIENT
Start: 2024-09-05 | End: 2024-09-08

## 2024-09-05 RX ORDER — ACETAMINOPHEN 500 MG
500 TABLET ORAL EVERY 4 HOURS PRN
Status: DISCONTINUED | OUTPATIENT
Start: 2024-09-05 | End: 2024-09-08

## 2024-09-05 RX ORDER — HYDROMORPHONE HYDROCHLORIDE 1 MG/ML
0.2 INJECTION, SOLUTION INTRAMUSCULAR; INTRAVENOUS; SUBCUTANEOUS EVERY 2 HOUR PRN
Status: DISCONTINUED | OUTPATIENT
Start: 2024-09-05 | End: 2024-09-08

## 2024-09-05 RX ORDER — SODIUM CHLORIDE 9 MG/ML
INJECTION, SOLUTION INTRAVENOUS CONTINUOUS
Status: DISCONTINUED | OUTPATIENT
Start: 2024-09-05 | End: 2024-09-05

## 2024-09-05 RX ORDER — HYDROMORPHONE HYDROCHLORIDE 1 MG/ML
0.4 INJECTION, SOLUTION INTRAMUSCULAR; INTRAVENOUS; SUBCUTANEOUS EVERY 2 HOUR PRN
Status: DISCONTINUED | OUTPATIENT
Start: 2024-09-05 | End: 2024-09-08

## 2024-09-05 RX ORDER — HYDROMORPHONE HYDROCHLORIDE 1 MG/ML
INJECTION, SOLUTION INTRAMUSCULAR; INTRAVENOUS; SUBCUTANEOUS
Status: COMPLETED
Start: 2024-09-05 | End: 2024-09-05

## 2024-09-05 RX ORDER — HYDROMORPHONE HYDROCHLORIDE 1 MG/ML
0.5 INJECTION, SOLUTION INTRAMUSCULAR; INTRAVENOUS; SUBCUTANEOUS ONCE
Status: COMPLETED | OUTPATIENT
Start: 2024-09-05 | End: 2024-09-05

## 2024-09-05 RX ORDER — ONDANSETRON 2 MG/ML
4 INJECTION INTRAMUSCULAR; INTRAVENOUS EVERY 6 HOURS PRN
Status: DISCONTINUED | OUTPATIENT
Start: 2024-09-05 | End: 2024-09-08

## 2024-09-05 RX ORDER — ONDANSETRON 2 MG/ML
INJECTION INTRAMUSCULAR; INTRAVENOUS
Status: COMPLETED
Start: 2024-09-05 | End: 2024-09-05

## 2024-09-05 NOTE — H&P
[facility] Hospitalist History and Physical      Chief Complaint   Patient presents with    Abdomen/Flank Pain        PCP: Steve Jean MD      History of Present Illness: Patient is a 74 year old female with PMH sig for HTN, HL, GERD, DM2, anxiety and hx of SBO p/w abdominal pain. Initially in her upper abdomen and started radiating/moving to her lower abdomen. Reporting nausea and multiple episodes of emesis. Symptoms began yesterday afternoon. Also feeling bloating. Hx of SBO 7 years ago manageed conservatively.  In the ED she was slightly hypertensive. Labs with leukocytosis. Imaging with SBO on CT A/P. Surgery consulted, NGT placed and admitted to floor.     Past Medical History:    Allergic reaction to bee sting    Anxiety    Cataract    bilateral    Diabetes (HCC)    Diabetes mellitus (HCC)    microalbuminuria that normalized    Esophageal reflux    interittent    Essential hypertension    High blood pressure    High cholesterol    History of small bowel obstruction    Hx of motion sickness    Hyperlipidemia    Hypertension    Incontinence    urine & stool    Overweight    Small bowel obstruction (HCC)    Recurrent, last one 6/21/2017. Always treated conservatively without surgery.    Tobacco use disorder    Visual impairment    glasses for reading or distance      Past Surgical History:   Procedure Laterality Date    Adenoidectomy      Anterior repair  11/17/2022    Appendectomy      Cataract      bilateral    Cholecystectomy      D & c      Enterocele repair  11/17/2022    Midurethral sling  11/17/2022    with cystoscopy    Other surgical history      s/p right wrist ganglion cyst resections    Posterior repair  11/17/2022    Tonsillectomy      Uterosacral ligament suspension  11/17/2022    Vaginal hysterectomy N/A 11/17/2022    Procedure: TOTAL VAGINAL HYSTERECTOMY  (WEEKS);  Surgeon: Leonidas Harley MD;  Location:  MAIN OR        ALL:  Allergies   Allergen Reactions    Bees ITCHING and SWELLING    Latex HIVES     Morphine NAUSEA AND VOMITING    Codeine [Opioid Analgesics]     Seasonal OTHER (SEE COMMENTS)     Seasonal, mold, dust, cat/dog dander. Runny nose, sneezing, congestion, cough.    Statins         No current outpatient medications on file.       Social History     Tobacco Use    Smoking status: Every Day     Current packs/day: 0.25     Average packs/day: 0.3 packs/day for 40.0 years (10.0 ttl pk-yrs)     Types: Cigarettes    Smokeless tobacco: Never   Substance Use Topics    Alcohol use: Yes     Alcohol/week: 0.0 - 1.0 standard drinks of alcohol     Comment: occasional        Fam Hx  Family History   Problem Relation Age of Onset    Stroke Father     Other (Other) Mother         osteoporosis    Obesity Sister     Diabetes Sister     Other (Other) Sister     High Cholesterol Brother     Arthritis Brother         osteoarthritis of ls spine    Obesity Brother     Diabetes Brother        Review of Systems  Comprehensive ROS reviewed and negative except for what is stated in HPI.      OBJECTIVE:  /77 (BP Location: Left arm)   Pulse 68   Temp 97 °F (36.1 °C) (Axillary)   Resp 18   Ht 5' 6\" (1.676 m)   Wt 182 lb (82.6 kg)   SpO2 98%   BMI 29.38 kg/m²   General:  Alert, no distress, appears stated age.    Head:  Normocephalic, without obvious abnormality, atraumatic.   Eyes:  Sclera anicteric, No conjunctival pallor, EOMs intact.    Nose: Nares normal. Septum midline. Mucosa normal. No drainage.   Throat: Lips, mucosa, and tongue normal. Teeth and gums normal.   Neck: Supple, symmetrical, trachea midline, no cervical or supraclavicular lymph adenopathy, thyroid: no enlargment/tenderness/nodules appreciated   Lungs:   Clear to auscultation bilaterally. Normal effort   Chest wall:  No tenderness or deformity.   Heart:  Regular rate and rhythm, S1, S2 normal, no murmur, rub or gallop appreciated   Abdomen:   Soft, non-tender. Bowel sounds hypoactive. No masses,  No organomegaly. Non distended   Extremities:  Extremities normal, atraumatic, no cyanosis or edema.   Skin: Skin color, texture, turgor normal. No rashes or lesions.    Neurologic: Normal strength, no focal deficit appreciated     Data Review:    LABS:   Lab Results   Component Value Date    WBC 11.8 09/05/2024    HGB 13.8 09/05/2024    HCT 40.0 09/05/2024    .0 09/05/2024    CREATSERUM 0.89 09/05/2024    BUN 16 09/05/2024     09/05/2024    K 4.2 09/05/2024     09/05/2024    CO2 26.0 09/05/2024     09/05/2024    CA 10.3 09/05/2024    ALB 4.6 09/05/2024    ALKPHO 65 09/05/2024    BILT 0.4 09/05/2024    TP 7.0 09/05/2024    AST 26 09/05/2024    ALT 17 09/05/2024    LIP 38 09/05/2024    PGLU 152 09/05/2024       CXR: All imaging personally reviewed.      Radiology: XR CHEST AP PORTABLE  (CPT=71045)    Result Date: 9/5/2024  PROCEDURE:  XR CHEST AP PORTABLE  (CPT=71045)  TECHNIQUE:  AP chest radiograph was obtained.  COMPARISON:  [facility] XR, CHEST PA   LATERAL, 11/05/2011, 2:38 PM.  INDICATIONS:  Verify correct tube placement  PATIENT STATED HISTORY: (As transcribed by Technologist)     FINDINGS:  Single-view of the lower chest and upper abdomen demonstrates tip of enteric tube in the expected location of the stomach.            CONCLUSION:  1. Tip of enteric tube within the stomach.  ED M.D. notified of these findings with preliminary radiology report from [facility] services.    LOCATION:  [facility]      Dictated by (CST): Mila Ly MD on 9/05/2024 at 8:04 AM     Finalized by (CST): Mila Ly MD on 9/05/2024 at 8:05 AM       CT ABDOMEN+PELVIS(CONTRAST ONLY)(CPT=74177)    Result Date: 9/5/2024  PROCEDURE:  CT ABDOMEN+PELVIS (CONTRAST ONLY) (CPT=74177)  COMPARISON:  [facility] , CT, CT ABDOMEN+PELVIS(CONTRAST ONLY)(CPT=74177), 6/21/2017, 0:29 AM.  INDICATIONS:  abd pain, h/o bowel obstruction  TECHNIQUE:  CT scanning was performed from the dome of the diaphragm to the pubic symphysis with non-ionic intravenous contrast material. Post contrast  coronal MPR imaging was performed.  Dose reduction techniques were used. Dose information is transmitted to the ACR (American College of Radiology) NRDR (National Radiology Data Registry) which includes the Dose Index Registry.  PATIENT STATED HISTORY:(As transcribed by Technologist)  Right and left upper quadrant pain with nausea and vomiting. History of multiple small bowel obstructions.   CONTRAST USED:  80cc of Isovue 370  FINDINGS: LUNG BASE:  Scattered scarring/atelectasis LIVER:  Unremarkable. BILIARY:  Status post cholecystectomy with stable probable postoperative prominence of the common bile duct. SPLEEN:  Unremarkable. PANCREAS:  Atrophic, otherwise unremarkable ADRENALS:  Unremarkable. KIDNEYS:  7 mm hypodensity in the upper pole left kidney is too small to further characterize and warrants no specific follow-up. AORTA/VASCULAR:  Moderate mixed plaque in the aorta and iliac arteries. RETROPERITONEUM:  Unremarkable. BOWEL/MESENTERY:  Small to medium-sized hiatal hernia.  There are dilated loops of small bowel with air-fluid levels and feculent material suggesting a small bowel obstruction.  The transition point is likely in the right abdomen.  A reference diameter of the small bowel is approximately 4 cm. There is mild mesenteric edema and scattered small amounts of interloop fluid.  Small amount of free fluid scattered in the abdomen and pelvis.  No free air.  Uncomplicated colonic diverticulosis. ABDOMINAL WALL:  Unremarkable. PELVIC ORGANS:  Unremarkable urinary bladder.  Status posthysterectomy. LYMPH NODES:  No lymphadenopathy in the abdomen or pelvis. BONES:  Degenerative changes in the spine and hips. OTHER:  None.            CONCLUSION:   1. There are dilated loops of small bowel with air-fluid levels and feculent material suggesting a small bowel obstruction.  The transition point is likely in the right abdomen.  A reference diameter of the small bowel is approximately 4 cm. There is mild  mesenteric edema and scattered small amounts of interloop fluid.  Small amount of free fluid scattered in the abdomen and pelvis.  No free air.   2. Small to medium-sized hiatal hernia.  3. Uncomplicated colonic diverticulosis.   Please see above for further details.  A preliminary report was provided by the [facility] teleradiology service.  LOCATION:  [facility]   Dictated by (CST): Gregg Castanon MD on 9/05/2024 at 6:06 AM     Finalized by (CST): Gregg Castanon MD on 9/05/2024 at 6:09 AM          Assessment/Plan:     74 year old female with PMH sig for HTN, HL, GERD, DM2, anxiety and hx of SBO p/w abdominal pain.     SBO  - NPO w ice chips  - NGT to LIS, per surgery  - serial abd exams  - IVF  - obstructive series in AM  - surgery following    Essential HTN  - holding hydrochlorothiazide/triamterene and lisinopril for NPO  - prn iv hydralazine    Hyperlipidemia  - holding statin for NPO    DM2  - accucheks, ISS    FEN: NPO, PT/OT  Proph: SCDs, lovenox  Code status: Full code    Outpatient records or previous hospital records reviewed.   [facility] hospitalist to continue to follow patient while in house      Roney Hinkle MD  [facility]  Hospitalist  Message over [employer]  Pager: [phone]        **Certification      PHYSICIAN Certification of Need for Inpatient Hospitalization - Initial Certification    Patient will require inpatient services that will reasonably be expected to span two midnight's based on the clinical documentation in H+P.   Based on patients current state of illness, I anticipate that, after discharge, patient will require TBD.

## 2024-09-05 NOTE — ED QUICK NOTES
Orders for admission, patient is aware of plan and ready to go upstairs. Any questions, please call ED RN Laya at extension [phone].     Patient Covid vaccination status: Fully vaccinated     COVID Test Ordered in ED: None    COVID Suspicion at Admission: N/A    Running Infusions:      Mental Status/LOC at time of transport: a/o x4    Other pertinent information:   CIWA score: N/A   NIH score:  N/A

## 2024-09-05 NOTE — PAYOR COMM NOTE
ADMISSION REVIEW     [place] [facility] [place]:  [id number]  Authorization Number: [id number]    Admit date: 9/5/24  Admit time:  6:25 AM       REVIEW DOCUMENTATION:     ED Provider Notes        ED Provider Notes signed by Hong Aguilar MD at 9/5/2024  2:14 AM       Author: Hong Aguilar MD Service: -- Author Type: Physician    Filed: 9/5/2024  2:14 AM Date of Service: 9/5/2024  2:08 AM Status: Signed    : Hong Aguilar MD (Physician)           Patient Seen in: [facility] Emergency Department      History     Chief Complaint   Patient presents with    Abdomen/Flank Pain     Stated Complaint: abd pain, h/o bowel obstruction    Subjective:   HPI    Patient is a 74-year-old female who started having some right upper quadrant pain initially earlier this afternoon and then started feeling more bloated in her lower abdomen and some nausea but no vomiting.  Does have a history of a bowel obstruction about 7 years ago.  The patient has been passing some gas but feels like her prior obstruction.  She was told it was from scar tissue and her last time has not had to have surgery to fix the obstruction resolved on its own with a NG tube    Objective:   Past Medical History:    Allergic reaction to bee sting    Anxiety    Cataract    bilateral    Diabetes (HCC)    Diabetes mellitus (HCC)    microalbuminuria that normalized    Esophageal reflux    interittent    Essential hypertension    High blood pressure    High cholesterol    History of small bowel obstruction    Hx of motion sickness    Hyperlipidemia    Hypertension    Incontinence    urine & stool    Overweight    Small bowel obstruction (HCC)    Recurrent, last one 6/21/2017. Always treated conservatively without surgery.    Tobacco use disorder    Visual impairment    glasses for reading or distance         Past Surgical History:   Procedure Laterality Date    Adenoidectomy      Anterior repair  11/17/2022    Appendectomy      Cataract      bilateral     Cholecystectomy      D & c      Enterocele repair  11/17/2022    Midurethral sling  11/17/2022    with cystoscopy    Other surgical history      s/p right wrist ganglion cyst resections    Posterior repair  11/17/2022    Tonsillectomy      Uterosacral ligament suspension  11/17/2022    Vaginal hysterectomy N/A 11/17/2022    Procedure: TOTAL VAGINAL HYSTERECTOMY  (RAYSHAWN);  Surgeon: Leonidas Harley MD;  Location:  MAIN OR         Social History     Socioeconomic History    Marital status:     Number of children: 2   Occupational History    Occupation: /maintenance [employer]   Tobacco Use    Smoking status: Every Day     Current packs/day: 0.25     Average packs/day: 0.3 packs/day for 40.0 years (10.0 ttl pk-yrs)     Types: Cigarettes    Smokeless tobacco: Never   Vaping Use    Vaping status: Never Used   Substance and Sexual Activity    Alcohol use: Yes     Alcohol/week: 0.0 - 1.0 standard drinks of alcohol     Comment: occasional    Drug use: No   Social History Narrative     - 1990  in hospital for seizure, mi, resp arrest and quit smoking. 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 1 step daughter -25 [facility] and living with mother and stepfather single doing ok ; 1 son - 20 [employer] and driving and working at [employer] and  on sunday. Year # 35 at [employer] Little walking      Review of Systems    Positive for stated Chief Complaint: Abdomen/Flank Pain    Other systems are as noted in HPI.  Constitutional and vital signs reviewed.      All other systems reviewed and negative except as noted above.    Physical Exam     ED Triage Vitals [09/05/24 0001]   /77   Pulse 76   Resp 18   Temp 97.2 °F (36.2 °C)   Temp src Temporal   SpO2 97 %   O2 Device None (Room air)       Current Vitals:   Vital Signs  BP: 151/56  Pulse: 71  Resp:  18  Temp: 97.2 °F (36.2 °C)  Temp src: Temporal  MAP (mmHg): 84    Oxygen Therapy  SpO2: 97 %  O2 Device: None (Room air)    Physical Exam      Vital signs reviewed  General appearance: Patient is alert and in moderate pain distress  HEENT: Pupils equal react to light extraocular muscles intact no scleral icterus, mucous membranes are moist, there is no erythema or exudate in the posterior pharynx  Neck: Supple no JVD no lymphadenopathy no meningismus no carotid bruit  CV: Regular rate and rhythm no murmur rub  Respiratory: Clear to auscultation bilaterally no crackles no wheezes no accessory muscle use  Abdomen: Mild tenderness in the right upper quadrant, mildly distended, no rebound no guarding  no hepatosplenomegaly bowel sounds are present , no pulsatile mass  Extremities: No clubbing cyanosis or edema 2+ distal pulses.  Neuro: Cranial nerves II through XII intact with no gross focal sensory or motor abnormality.      ED Course     Labs Reviewed   COMP METABOLIC PANEL (14) - Abnormal; Notable for the following components:       Result Value    Glucose 211 (*)     Calculated Osmolality 297 (*)     All other components within normal limits   CBC WITH DIFFERENTIAL WITH PLATELET - Abnormal; Notable for the following components:    WBC 11.8 (*)     Neutrophil Absolute Prelim 8.86 (*)     Neutrophil Absolute 8.86 (*)     All other components within normal limits   LIPASE - Normal   URINALYSIS WITH CULTURE REFLEX     Patient's bowel sounds are present white count was 11.8.  Chemistry was unremarkable along with a lipase we will get a CT scan will give her some pain medication IV fluids and Zofran.    CT ABDOMEN PELVIS WITH IV CONTRAST    Comparison 6/21/2017      IMPRESSION:  -Recurrent high-grade SBO with a transition point in the right upper quadrant, similar in location to the prior study.  This may be due to an adhesion.  Obstructed small bowel is dilated up to 3.3 cm in diameter with air-fluid levels and  mesenteric edema.  No pneumatosis or portal venous gas to suggest ischemia.    -No aortic aneurysm.  -Small hiatal hernia.  -No evidence of an obstructive uropathy.  -Colonic diverticulosis without evidence of acute diverticulitis.  -Small amount of ascites.  No pneumoperitoneum.  -Prior cholecystectomy, appendectomy and hysterectomy.      Patient unfortunately does have a small bowel obstruction same transition point in the right upper quadrant as last time.  Will have an NG placed and patient will be admitted we will discuss with surgery and the hospitalist.  MDM      Differential diagnosis reflecting the complexity of care include: Small bowel obstruction, gastritis, constipation    Comorbidities that add complexity to management include: History of a small bowel obstruction in the past      Discussions of management was done with: Surgery and the hospitalist were consulted and will see the patient on the floor    My independent interpretation of studies of: CT scan does show right upper quadrant transition for small bowel obstruction and dilated small bowel no free air also has some ascites    Shared decision making was done by myself and patient surgery and the hospitalist.  Patient be admitted for bowel decompression with an NG tube and further workup from surgery and the hospitalist.    Patient was evaluated in the emergency department and at this point patient will need admission for further management of medical condition.  Patient was stable in the emergency department and will be transferred to floor for further definitive care.  Patient's questions were answered.    This note was prepared using Dragon Medical voice recognition dictation software. As a result errors may occur. When identified these errors have been corrected. While every attempt is made to correct errors during dictation discrepancies may still exist      Admission disposition: 9/5/2024  2:14 AM    Medical Decision Making      Disposition  and Plan     Clinical Impression:  1. Small bowel obstruction (HCC)         Disposition:  Admit  9/5/2024  2:14 am       Present on Admission  Date Reviewed: 2/4/2022            ICD-10-CM Noted POA    * (Principal) Small bowel obstruction (HCC) K56.609 9/5/2024 Unknown         Signed by Hong Aguilar MD on 9/5/2024  2:14 AM         MEDICATIONS ADMINISTERED IN LAST 1 DAY:  heparin (Porcine) 5000 UNIT/ML injection 5,000 Units       Date Action Dose Route User    9/5/2024 0920 Given 5,000 Units Subcutaneous (Right Lower Abdomen) Mario Carranza RN          HYDROmorphone (Dilaudid) 1 MG/ML injection 0.5 mg       Date Action Dose Route User    9/5/2024 0214 Given 0.5 mg Intravenous Laya Amador RN          HYDROmorphone (Dilaudid) 1 MG/ML injection 0.5 mg       Date Action Dose Route User    9/5/2024 0451 Given 0.5 mg Intravenous Susy Russell RN          HYDROmorphone (Dilaudid) 1 MG/ML injection 0.4 mg       Date Action Dose Route User    9/5/2024 1048 Given 0.4 mg Intravenous Mario Carranza RN          HYDROmorphone (Dilaudid) 1 MG/ML injection 0.8 mg       Date Action Dose Route User    9/5/2024 0651 Given 0.8 mg Intravenous Mamie Jasmine RN          HYDROmorphone (Dilaudid) 1 MG/ML injection       Date Action Dose Route User    9/5/2024 0651 Given 0.8 mg Intravenous Mamie Jasmine RN          iopamidol 76% (ISOVUE-370) injection for power injector       Date Action Dose Route User    9/5/2024 0134 Given 80 mL Intravenous Conchita Capone          ondansetron (Zofran) 4 MG/2ML injection 4 mg       Date Action Dose Route User    9/5/2024 0650 Given 4 mg Intravenous Mamie Jasmine RN          ondansetron (Zofran) 4 MG/2ML injection       Date Action Dose Route User    9/5/2024 0650 Given 4 mg Intravenous Mamie Jasmine RN          sodium chloride 0.9% infusion       Date Action Dose Route User    9/5/2024 0920 New Bag (none) Intravenous Mario Carranza, RN          sodium chloride 0.9 % IV bolus  1,000 mL       Date Action Dose Route User    9/5/2024 0213 New Bag 1,000 mL Intravenous Laya Amador, RN            Vitals (last day)       Date/Time Temp Pulse Resp BP SpO2 Weight O2 Device O2 Flow Rate (L/min) [employer]    09/05/24 1144 97 °F (36.1 °C) 68 18 118/77 98 % -- None (Room air) -- IP    09/05/24 0637 -- -- -- -- -- 182 lb (82.6 kg) -- -- RA    09/05/24 0636 97.9 °F (36.6 °C) 69 18 159/63 100 % -- None (Room air) 0 L/min HB    09/05/24 0600 -- 63 -- 140/67 98 % -- None (Room air) --     09/05/24 0530 -- 62 -- 141/57 97 % -- None (Room air) --     09/05/24 0430 -- 67 16 151/63 100 % -- -- --     09/05/24 0330 -- 74 -- 161/73 99 % -- None (Room air) --     09/05/24 0300 -- 72 18 136/63 99 % -- None (Room air) -- SV    09/05/24 0214 -- 74 20 126/106 100 % -- None (Room air) --     09/05/24 0030 -- 71 -- 151/56 -- -- -- -- TO    09/05/24 0001 97.2 °F (36.2 °C) 76 18 146/77 97 % 182 lb (82.6 kg) None (Room air) -- RF       9/5/2024 H&P  Chief Complaint   Patient presents with    Abdomen/Flank Pain         PCP: Steve Jean MD        History of Present Illness: Patient is a 74 year old female with PMH sig for HTN, HL, GERD, DM2, anxiety and hx of SBO p/w abdominal pain. Initially in her upper abdomen and started radiating/moving to her lower abdomen. Reporting nausea and multiple episodes of emesis. Symptoms began yesterday afternoon. Also feeling bloating. Hx of SBO 7 years ago manageed conservatively.  In the ED she was slightly hypertensive. Labs with leukocytosis. Imaging with SBO on CT A/P. Surgery consulted, NGT placed and admitted to floor.     /77 (BP Location: Left arm)  Pulse 68  Temp 97 °F (36.1 °C) (Axillary)  Resp 18  Ht 5' 6\" (1.676 m)  Wt 182 lb (82.6 kg)  SpO2 98%  BMI 29.38 kg/m²     Lab Results   Component Value Date     WBC 11.8 09/05/2024     HGB 13.8 09/05/2024     HCT 40.0 09/05/2024     .0 09/05/2024     CREATSERUM 0.89 09/05/2024     BUN 16 09/05/2024     NA  140 09/05/2024     K 4.2 09/05/2024      09/05/2024     CO2 26.0 09/05/2024      09/05/2024     CA 10.3 09/05/2024     ALB 4.6 09/05/2024     ALKPHO 65 09/05/2024     BILT 0.4 09/05/2024     TP 7.0 09/05/2024     AST 26 09/05/2024     ALT 17 09/05/2024     LIP 38 09/05/2024     PGLU 152 09/05/2024        Assessment/Plan:      74 year old female with PMH sig for HTN, HL, GERD, DM2, anxiety and hx of SBO p/w abdominal pain.      SBO  - NPO w ice chips  - NGT to LIS, per surgery  - serial abd exams  - IVF  - obstructive series in AM  - surgery following     Essential HTN  - holding hydrochlorothiazide/triamterene and lisinopril for NPO  - prn iv hydralazine     Hyperlipidemia  - holding statin for NPO     DM2  - accucheks, ISS     FEN: NPO, PT/OT  Proph: SCDs, lovenox  Code status: Full code     Outpatient records or previous hospital records reviewed.   [facility] hospitalist to continue to follow patient while in house        Roney Hinkle MD  [facility]      9/5/2024 General Surgery   Reason for Consultation:     Surgical Consultation      CC:       Chief Complaint   Patient presents with    Abdomen/Flank Pain         History of Present Illness:     Valarie Guillory is a a(n) 74 year old female. Patient complains of abdominal pain, she felt she had eaten too much corn, the pain was initially in her upper abdomen, had traveled down into lower abdomen describes the pain as contractions, some nausea  Ct scan obtained revealing SBO with transition point. Mesenteric edema, no pneumatosis noted.   Patient was admitted, Ng tube inserted - once to the floor no output was noted- RN flushed NG tube with resolution.   Patient has had previous SBO that was managed conservatively, last one in 2017  Previous abdominal surgery includes, appendectomy, cholecystectomy, hysterectomy, bladder sling  Denies any use of anticoagulation at home     ABDOMEN: soft, mildly distended, mild tenderness right mid abdomen without  guarding, no peritonitis     Lab 09/05/24  0027   WBC 11.8*     *     Impression:     75 y/o with SBO  CT scan as noted above  Afebrile, leukocytosis  Appears non toxic, soft, mild tenderness right mid abdomen no peritonitis     Plan:     Reviewed with patient recommendations for conservative management   -NPO ice chips ok  -NG to LIS  -serial abdominal exams  -IV fluids  -encourage ambulation  -obstructive series  Explained to patient that if their condition deteriorates, they may require surgical management.   All questions answered        ANDRAE Jim  [facility]  9/5/2024    HYDROmorphone (Dilaudid) 1 MG/ML injection 0.5 mg  Dose: 0.5 mg  Freq: Once Route: IV  Start: 09/05/24 0447 End: 09/05/24 0451    HYDROmorphone (Dilaudid) 1 MG/ML injection 0.5 mg  Dose: 0.5 mg  Freq: Once Route: IV  Start: 09/05/24 0159 End: 09/05/24 0214    sodium chloride 0.9 % IV bolus 1,000 mL  Dose: 1,000 mL  Freq: Once Route: IV  Last Dose: 1,000 mL (09/05/24 0213)  Start: 09/05/24 0159 End: 09/05/24 0313      insulin aspart (NovoLOG) 100 Units/mL FlexPen 2-10 Units  Dose: 2-10 Units  Freq: 3 times daily before meals and nightly Route: SC  Start: 09/05/24 0700    lactated ringers infusion  Rate: 100 mL/hr  Freq: Continuous Route: IV  Start: 09/05/24 1245    sodium chloride 0.9% infusion  Rate: 100 mL/hr  Freq: Continuous Route: IV  Start: 09/05/24 0700 End: 09/05/24 1238

## 2024-09-05 NOTE — CONSULTS
[facility]  Report of Consultation    Valarie Guillory Patient Status:  Inpatient    1950 MRN [id number]   Location [facility] 3NW-A Attending Caleb Dockery MD   Hosp Day # 0 PCP Steve Jean MD     24    Reason for Consultation:    Surgical Consultation     CC:   Chief Complaint   Patient presents with    Abdomen/Flank Pain        History of Present Illness:    Valarie Guillory is a a(n) 74 year old female. Patient complains of abdominal pain, she felt she had eaten too much corn, the pain was initially in her upper abdomen, had traveled down into lower abdomen describes the pain as contractions, some nausea  Ct scan obtained revealing SBO with transition point. Mesenteric edema, no pneumatosis noted.   Patient was admitted, Ng tube inserted - once to the floor no output was noted- RN flushed NG tube with resolution.   Patient has had previous SBO that was managed conservatively, last one in   Previous abdominal surgery includes, appendectomy, cholecystectomy, hysterectomy, bladder sling  Denies any use of anticoagulation at home     Past Medical History:    Past Medical History:    Allergic reaction to bee sting    Anxiety    Cataract    bilateral    Diabetes (HCC)    Diabetes mellitus (HCC)    microalbuminuria that normalized    Esophageal reflux    interittent    Essential hypertension    High blood pressure    High cholesterol    History of small bowel obstruction    Hx of motion sickness    Hyperlipidemia    Hypertension    Incontinence    urine & stool    Overweight    Small bowel obstruction (HCC)    Recurrent, last one 2017. Always treated conservatively without surgery.    Tobacco use disorder    Visual impairment    glasses for reading or distance       Past Surgical History:    Past Surgical History:   Procedure Laterality Date    Adenoidectomy      Anterior repair  2022    Appendectomy      Cataract      bilateral    Cholecystectomy      D & c      Enterocele  repair  11/17/2022    Midurethral sling  11/17/2022    with cystoscopy    Other surgical history      s/p right wrist ganglion cyst resections    Posterior repair  11/17/2022    Tonsillectomy      Uterosacral ligament suspension  11/17/2022    Vaginal hysterectomy N/A 11/17/2022    Procedure: TOTAL VAGINAL HYSTERECTOMY  (WEEKS);  Surgeon: Leonidas Harley MD;  Location:  MAIN OR       Family History:    Family History   Problem Relation Age of Onset    Stroke Father     Other (Other) Mother         osteoporosis    Obesity Sister     Diabetes Sister     Other (Other) Sister     High Cholesterol Brother     Arthritis Brother         osteoarthritis of ls spine    Obesity Brother     Diabetes Brother        Social History:     reports that she has been smoking cigarettes. She has a 10 pack-year smoking history. She has never used smokeless tobacco. She reports current alcohol use. She reports that she does not use drugs.    Allergies:    Allergies   Allergen Reactions    Bees ITCHING and SWELLING    Latex HIVES    Morphine NAUSEA AND VOMITING    Codeine [Opioid Analgesics]     Seasonal OTHER (SEE COMMENTS)     Seasonal, mold, dust, cat/dog dander. Runny nose, sneezing, congestion, cough.    Statins        Current Medications:      Current Facility-Administered Medications:     sodium chloride 0.9% infusion, , Intravenous, Continuous    heparin (Porcine) 5000 UNIT/ML injection 5,000 Units, 5,000 Units, Subcutaneous, Q8H RENUKA    acetaminophen (Tylenol Extra Strength) tab 500 mg, 500 mg, Oral, Q4H PRN    HYDROmorphone (Dilaudid) 1 MG/ML injection 0.2 mg, 0.2 mg, Intravenous, Q2H PRN **OR** HYDROmorphone (Dilaudid) 1 MG/ML injection 0.4 mg, 0.4 mg, Intravenous, Q2H PRN **OR** HYDROmorphone (Dilaudid) 1 MG/ML injection 0.8 mg, 0.8 mg, Intravenous, Q2H PRN    ondansetron (Zofran) 4 MG/2ML injection 4 mg, 4 mg, Intravenous, Q6H PRN    metoclopramide (Reglan) 5 mg/mL injection 5 mg, 5 mg, Intravenous, Q8H PRN    glucose (Dex4)  15 GM/59ML oral liquid 15 g, 15 g, Oral, Q15 Min PRN **OR** glucose (Glutose) 40% oral gel 15 g, 15 g, Oral, Q15 Min PRN **OR** glucose-vitamin C (Dex-4) chewable tab 4 tablet, 4 tablet, Oral, Q15 Min PRN **OR** dextrose 50% injection 50 mL, 50 mL, Intravenous, Q15 Min PRN **OR** glucose (Dex4) 15 GM/59ML oral liquid 30 g, 30 g, Oral, Q15 Min PRN **OR** glucose (Glutose) 40% oral gel 30 g, 30 g, Oral, Q15 Min PRN **OR** glucose-vitamin C (Dex-4) chewable tab 8 tablet, 8 tablet, Oral, Q15 Min PRN    insulin aspart (NovoLOG) 100 Units/mL FlexPen 2-10 Units, 2-10 Units, Subcutaneous, TID AC and HS    Home Medications:    No current facility-administered medications on file prior to encounter.     Current Outpatient Medications on File Prior to Encounter   Medication Sig Dispense Refill    estradiol (ESTRACE) 0.1 MG/GM Vaginal Cream Apply 1/2 gram vaginally 2 times per week. 42 g 3    pyridoxine 100 MG Oral Tab Take 1 tablet (100 mg total) by mouth daily. B6      Coenzyme Q10 (CO Q-10) 100 MG Oral Cap Take 100 mg by mouth daily.      Echinacea 400 MG Oral Cap Take 400 mg by mouth daily.      magnesium oxide 400 MG Oral Tab Take 1 tablet (400 mg total) by mouth daily.      Potassium 99 MG Oral Tab Take 99 mg by mouth daily.      NON FORMULARY Take 1 tablet by mouth daily. [facility].      Wheat Dextrin (BENEFIBER OR) Take by mouth daily.      Polyethylene Glycol 3350 (MIRALAX OR) Take by mouth daily.      cetirizine 10 MG Oral Tab Take 1 tablet (10 mg total) by mouth daily as needed for Allergies.      Biotin 5000 MCG Oral Cap Take 5,000 mcg by mouth daily.      CRANBERRY EXTRACT OR Take 10,000 mg by mouth daily. [id number] with Vitamin C      Cholecalciferol 125 MCG (5000 UT) Oral Tab Take 1 tablet (5,000 Units total) by mouth daily.      glimepiride 2 MG Oral Tab Take 1 tablet (2 mg total) by mouth daily.      atorvastatin 10 MG Oral Tab Take 1 tablet (10 mg total) by mouth every evening. 90 tablet 1     Triamterene-HCTZ 37.5-25 MG Oral Tab Take 1 tablet by mouth daily. 90 tablet 1    metFORMIN HCl 1000 MG Oral Tab Take 1 tablet (1,000 mg total) by mouth 2 (two) times daily with meals. 180 tablet 1    lisinopril 10 MG Oral Tab Take 1 tablet (10 mg total) by mouth daily. (Patient taking differently: Take 1 tablet (10 mg total) by mouth every evening.) 90 tablet 1    Glucose Blood (ACCU-CHEK GUIDE) In Vitro Strip 1 strip by In Vitro route daily. 200 strip 3    EPINEPHrine (ADRENACLICK) 0.3 MG/0.3ML Injection Solution Auto-injector Inject 0.3 mL (1 each total) as directed as needed. (Patient not taking: Reported on 11/23/2022) 1 each 0       Review of Systems:    10 point review performed pertinent positives and negatives per history of present illness.    Physical Exam:    Blood pressure 159/63, pulse 69, temperature 97.9 °F (36.6 °C), temperature source Oral, resp. rate 18, height 5' 6\" (1.676 m), weight 182 lb (82.6 kg), SpO2 100%, not currently breastfeeding.    GENERAL: Alert and oriented x 3, well developed, well nourished female, in no apparent distress    SKIN: anicteric    RESPIRATORY: non labored breathing    CARDIOVASCULAR: RRR    ABDOMEN: soft, mildly distended, mild tenderness right mid abdomen without guarding, no peritonitis     LYMPHATIC: no lymphadenopathy    EXTREMITIES: no cyanosis, clubbing or edema    .    Laboratory Data:  Recent Labs   Lab 09/05/24  0027   WBC 11.8*   HGB 13.8   MCV 86.6   .0       Recent Labs   Lab 09/05/24  0027      K 4.2      CO2 26.0   BUN 16   CREATSERUM 0.89   *   CA 10.3       Recent Labs   Lab 09/05/24  0027   ALT 17   AST 26   ALB 4.6       No results for input(s): \"TROP\" in the last 168 hours.          Radiology:    XR CHEST AP PORTABLE  (CPT=71045)    Result Date: 9/5/2024  PROCEDURE:  XR CHEST AP PORTABLE  (CPT=71045)  TECHNIQUE:  AP chest radiograph was obtained.  COMPARISON:  [facility] , XR, CHEST PA   LATERAL, 11/05/2011, 2:38 PM.   INDICATIONS:  Verify correct tube placement  PATIENT STATED HISTORY: (As transcribed by Technologist)     FINDINGS:  Single-view of the lower chest and upper abdomen demonstrates tip of enteric tube in the expected location of the stomach.            CONCLUSION:  1. Tip of enteric tube within the stomach.  BEATRIZ JOHN notified of these findings with preliminary radiology report from [facility] services.    LOCATION:  [facility]      Dictated by (CST): Mila Ly MD on 9/05/2024 at 8:04 AM     Finalized by (CST): Mila Ly MD on 9/05/2024 at 8:05 AM       CT ABDOMEN+PELVIS(CONTRAST ONLY)(CPT=74177)    Result Date: 9/5/2024  PROCEDURE:  CT ABDOMEN+PELVIS (CONTRAST ONLY) (CPT=74177)  COMPARISON:  [facility] , CT, CT ABDOMEN+PELVIS(CONTRAST ONLY)(CPT=74177), 6/21/2017, 0:29 AM.  INDICATIONS:  abd pain, h/o bowel obstruction  TECHNIQUE:  CT scanning was performed from the dome of the diaphragm to the pubic symphysis with non-ionic intravenous contrast material. Post contrast coronal MPR imaging was performed.  Dose reduction techniques were used. Dose information is transmitted to the ACR (American College of Radiology) NRDR (National Radiology Data Registry) which includes the Dose Index Registry.  PATIENT STATED HISTORY:(As transcribed by Technologist)  Right and left upper quadrant pain with nausea and vomiting. History of multiple small bowel obstructions.   CONTRAST USED:  80cc of Isovue 370  FINDINGS: LUNG BASE:  Scattered scarring/atelectasis LIVER:  Unremarkable. BILIARY:  Status post cholecystectomy with stable probable postoperative prominence of the common bile duct. SPLEEN:  Unremarkable. PANCREAS:  Atrophic, otherwise unremarkable ADRENALS:  Unremarkable. KIDNEYS:  7 mm hypodensity in the upper pole left kidney is too small to further characterize and warrants no specific follow-up. AORTA/VASCULAR:  Moderate mixed plaque in the aorta and iliac arteries. RETROPERITONEUM:  Unremarkable. BOWEL/MESENTERY:  Small to  medium-sized hiatal hernia.  There are dilated loops of small bowel with air-fluid levels and feculent material suggesting a small bowel obstruction.  The transition point is likely in the right abdomen.  A reference diameter of the small bowel is approximately 4 cm. There is mild mesenteric edema and scattered small amounts of interloop fluid.  Small amount of free fluid scattered in the abdomen and pelvis.  No free air.  Uncomplicated colonic diverticulosis. ABDOMINAL WALL:  Unremarkable. PELVIC ORGANS:  Unremarkable urinary bladder.  Status posthysterectomy. LYMPH NODES:  No lymphadenopathy in the abdomen or pelvis. BONES:  Degenerative changes in the spine and hips. OTHER:  None.            CONCLUSION:   1. There are dilated loops of small bowel with air-fluid levels and feculent material suggesting a small bowel obstruction.  The transition point is likely in the right abdomen.  A reference diameter of the small bowel is approximately 4 cm. There is mild mesenteric edema and scattered small amounts of interloop fluid.  Small amount of free fluid scattered in the abdomen and pelvis.  No free air.   2. Small to medium-sized hiatal hernia.  3. Uncomplicated colonic diverticulosis.   Please see above for further details.  A preliminary report was provided by the [facility] teleradiology service.  LOCATION:  [facility]   Dictated by (CST): Gregg Castanon MD on 9/05/2024 at 6:06 AM     Finalized by (CST): Gregg Castanon MD on 9/05/2024 at 6:09 AM          Problem List:    Patient Active Problem List   Diagnosis    Hypertension    Smoker    Overweight (BMI 25.0-29.9)    PVD (posterior vitreous detachment)    Hyperlipidemia    Anxiety    Aortic atherosclerosis (HCC)    Type 2 diabetes mellitus without complication, without long-term current use of insulin (HCC)    Uterovaginal prolapse, incomplete    Small bowel obstruction (HCC)       Impression:    75 y/o with SBO  CT scan as noted above  Afebrile, leukocytosis  Appears  non toxic, soft, mild tenderness right mid abdomen no peritonitis    Plan:    Reviewed with patient recommendations for conservative management   -NPO ice chips ok  -NG to LIS  -serial abdominal exams  -IV fluids  -encourage ambulation  -obstructive series  Explained to patient that if their condition deteriorates, they may require surgical management.   All questions answered      ANDRAE Jim  [facility]  General Surgery  9/5/2024    Addendum late entry  Patient seen and examined with Emma  Patient was already feeling better  No complaints of pain  Keep NGT to suction and encouraged ambulation  Ok to have ice chips and chew gum  OBS in am  Voiced understanding

## 2024-09-05 NOTE — ED INITIAL ASSESSMENT (HPI)
C/o low abd pain all across, + n/v. Hx of bowel obstruction. States she has had small amount of flatus.

## 2024-09-05 NOTE — ED PROVIDER NOTES
Patient Seen in: [facility] Emergency Department      History     Chief Complaint   Patient presents with    Abdomen/Flank Pain     Stated Complaint: abd pain, h/o bowel obstruction    Subjective:   HPI    Patient is a 74-year-old female who started having some right upper quadrant pain initially earlier this afternoon and then started feeling more bloated in her lower abdomen and some nausea but no vomiting.  Does have a history of a bowel obstruction about 7 years ago.  The patient has been passing some gas but feels like her prior obstruction.  She was told it was from scar tissue and her last time has not had to have surgery to fix the obstruction resolved on its own with a NG tube    Objective:   Past Medical History:    Allergic reaction to bee sting    Anxiety    Cataract    bilateral    Diabetes (HCC)    Diabetes mellitus (HCC)    microalbuminuria that normalized    Esophageal reflux    interittent    Essential hypertension    High blood pressure    High cholesterol    History of small bowel obstruction    Hx of motion sickness    Hyperlipidemia    Hypertension    Incontinence    urine & stool    Overweight    Small bowel obstruction (HCC)    Recurrent, last one 6/21/2017. Always treated conservatively without surgery.    Tobacco use disorder    Visual impairment    glasses for reading or distance              Past Surgical History:   Procedure Laterality Date    Adenoidectomy      Anterior repair  11/17/2022    Appendectomy      Cataract      bilateral    Cholecystectomy      D & c      Enterocele repair  11/17/2022    Midurethral sling  11/17/2022    with cystoscopy    Other surgical history      s/p right wrist ganglion cyst resections    Posterior repair  11/17/2022    Tonsillectomy      Uterosacral ligament suspension  11/17/2022    Vaginal hysterectomy N/A 11/17/2022    Procedure: TOTAL VAGINAL HYSTERECTOMY  (RAYSHAWN);  Surgeon: Leonidas Harley MD;  Location: [facility] OR                Social  History     Socioeconomic History    Marital status:     Number of children: 2   Occupational History    Occupation: /maintenance [employer]   Tobacco Use    Smoking status: Every Day     Current packs/day: 0.25     Average packs/day: 0.3 packs/day for 40.0 years (10.0 ttl pk-yrs)     Types: Cigarettes    Smokeless tobacco: Never   Vaping Use    Vaping status: Never Used   Substance and Sexual Activity    Alcohol use: Yes     Alcohol/week: 0.0 - 1.0 standard drinks of alcohol     Comment: occasional    Drug use: No   Social History Narrative     - 1990  in hospital for seizure, mi, resp arrest and quit smoking. 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 1 step daughter -25 mireille Frederick and living with mother and stepfather single doing ok ; 1 son - 20 graduate [employer] grad and driving and working at [employer] and  on sunday. Year # 35 at [email or web address]). Little walking               Review of Systems    Positive for stated Chief Complaint: Abdomen/Flank Pain    Other systems are as noted in HPI.  Constitutional and vital signs reviewed.      All other systems reviewed and negative except as noted above.    Physical Exam     ED Triage Vitals [09/05/24 0001]   /77   Pulse 76   Resp 18   Temp 97.2 °F (36.2 °C)   Temp src Temporal   SpO2 97 %   O2 Device None (Room air)       Current Vitals:   Vital Signs  BP: 151/56  Pulse: 71  Resp: 18  Temp: 97.2 °F (36.2 °C)  Temp src: Temporal  MAP (mmHg): 84    Oxygen Therapy  SpO2: 97 %  O2 Device: None (Room air)            Physical Exam      Vital signs reviewed  General appearance: Patient is alert and in moderate pain distress  HEENT: Pupils equal react to light extraocular muscles intact no scleral icterus, mucous membranes are moist, there is no erythema or exudate in the posterior pharynx  Neck: Supple no JVD no  lymphadenopathy no meningismus no carotid bruit  CV: Regular rate and rhythm no murmur rub  Respiratory: Clear to auscultation bilaterally no crackles no wheezes no accessory muscle use  Abdomen: Mild tenderness in the right upper quadrant, mildly distended, no rebound no guarding  no hepatosplenomegaly bowel sounds are present , no pulsatile mass  Extremities: No clubbing cyanosis or edema 2+ distal pulses.  Neuro: Cranial nerves II through XII intact with no gross focal sensory or motor abnormality.      ED Course     Labs Reviewed   COMP METABOLIC PANEL (14) - Abnormal; Notable for the following components:       Result Value    Glucose 211 (*)     Calculated Osmolality 297 (*)     All other components within normal limits   CBC WITH DIFFERENTIAL WITH PLATELET - Abnormal; Notable for the following components:    WBC 11.8 (*)     Neutrophil Absolute Prelim 8.86 (*)     Neutrophil Absolute 8.86 (*)     All other components within normal limits   LIPASE - Normal   URINALYSIS WITH CULTURE REFLEX             Patient's bowel sounds are present white count was 11.8.  Chemistry was unremarkable along with a lipase we will get a CT scan will give her some pain medication IV fluids and Zofran.     CT ABDOMEN PELVIS WITH IV CONTRAST    Comparison 6/21/2017      IMPRESSION:  -Recurrent high-grade SBO with a transition point in the right upper quadrant, similar in location to the prior study.  This may be due to an adhesion.  Obstructed small bowel is dilated up to 3.3 cm in diameter with air-fluid levels and mesenteric edema.  No pneumatosis or portal venous gas to suggest ischemia.    -No aortic aneurysm.  -Small hiatal hernia.  -No evidence of an obstructive uropathy.  -Colonic diverticulosis without evidence of acute diverticulitis.  -Small amount of ascites.  No pneumoperitoneum.  -Prior cholecystectomy, appendectomy and hysterectomy.      Patient unfortunately does have a small bowel obstruction same transition point in  the right upper quadrant as last time.  Will have an NG placed and patient will be admitted we will discuss with surgery and the hospitalist.  MDM      Differential diagnosis reflecting the complexity of care include: Small bowel obstruction, gastritis, constipation    Comorbidities that add complexity to management include: History of a small bowel obstruction in the past      Discussions of management was done with: Surgery and the hospitalist were consulted and will see the patient on the floor    My independent interpretation of studies of: CT scan does show right upper quadrant transition for small bowel obstruction and dilated small bowel no free air also has some ascites        Shared decision making was done by myself and patient surgery and the hospitalist.  Patient be admitted for bowel decompression with an NG tube and further workup from surgery and the hospitalist.    Patient was evaluated in the emergency department and at this point patient will need admission for further management of medical condition.  Patient was stable in the emergency department and will be transferred to floor for further definitive care.  Patient's questions were answered.    This note was prepared using Dragon Medical voice recognition dictation software. As a result errors may occur. When identified these errors have been corrected. While every attempt is made to correct errors during dictation discrepancies may still exist      Admission disposition: 9/5/2024  2:14 AM                                        Medical Decision Making      Disposition and Plan     Clinical Impression:  1. Small bowel obstruction (HCC)         Disposition:  Admit  9/5/2024  2:14 am    Follow-up:  No follow-up provider specified.        Medications Prescribed:  Current Discharge Medication List                            Hospital Problems       Present on Admission  Date Reviewed: 2/4/2022            ICD-10-CM Noted POA    * (Principal) Small  bowel obstruction (HCC) K56.609 9/5/2024 Unknown

## 2024-09-05 NOTE — PLAN OF CARE
Pt A&Ox4, resting in bed.  Resp: RA  GI/: voids normally  Activity/Safety: up w/asst  Skin: intact  Pain: Dilaudid given this morning  Pt and family updated on and agreeable to POC; IV fluids, NGT-LIS w/ice chips and gum. Low sugar noted, no intervention.

## 2024-09-06 ENCOUNTER — APPOINTMENT (OUTPATIENT)
Dept: GENERAL RADIOLOGY | Facility: HOSPITAL | Age: 74
End: 2024-09-06
Attending: PHYSICIAN ASSISTANT
Payer: MEDICARE

## 2024-09-06 LAB
ALBUMIN SERPL-MCNC: 4 G/DL (ref 3.2–4.8)
ALBUMIN/GLOB SERPL: 2.1 {RATIO} (ref 1–2)
ALP LIVER SERPL-CCNC: 54 U/L
ALT SERPL-CCNC: 14 U/L
ANION GAP SERPL CALC-SCNC: 8 MMOL/L (ref 0–18)
AST SERPL-CCNC: 18 U/L (ref ?–34)
BASOPHILS # BLD AUTO: 0.04 X10(3) UL (ref 0–0.2)
BASOPHILS NFR BLD AUTO: 0.6 %
BILIRUB SERPL-MCNC: 0.6 MG/DL (ref 0.2–1.1)
BUN BLD-MCNC: 12 MG/DL (ref 9–23)
CALCIUM BLD-MCNC: 9.4 MG/DL (ref 8.7–10.4)
CHLORIDE SERPL-SCNC: 105 MMOL/L (ref 98–112)
CO2 SERPL-SCNC: 30 MMOL/L (ref 21–32)
CREAT BLD-MCNC: 0.75 MG/DL
EGFRCR SERPLBLD CKD-EPI 2021: 84 ML/MIN/1.73M2 (ref 60–?)
EOSINOPHIL # BLD AUTO: 0.42 X10(3) UL (ref 0–0.7)
EOSINOPHIL NFR BLD AUTO: 6.2 %
ERYTHROCYTE [DISTWIDTH] IN BLOOD BY AUTOMATED COUNT: 12.4 %
GLOBULIN PLAS-MCNC: 1.9 G/DL (ref 2–3.5)
GLUCOSE BLD-MCNC: 105 MG/DL (ref 70–99)
GLUCOSE BLD-MCNC: 109 MG/DL (ref 70–99)
GLUCOSE BLD-MCNC: 112 MG/DL (ref 70–99)
GLUCOSE BLD-MCNC: 89 MG/DL (ref 70–99)
GLUCOSE BLD-MCNC: 95 MG/DL (ref 70–99)
HCT VFR BLD AUTO: 35.4 %
HGB BLD-MCNC: 12.2 G/DL
IMM GRANULOCYTES # BLD AUTO: 0.02 X10(3) UL (ref 0–1)
IMM GRANULOCYTES NFR BLD: 0.3 %
LYMPHOCYTES # BLD AUTO: 2.35 X10(3) UL (ref 1–4)
LYMPHOCYTES NFR BLD AUTO: 34.9 %
MCH RBC QN AUTO: 29.8 PG (ref 26–34)
MCHC RBC AUTO-ENTMCNC: 34.5 G/DL (ref 31–37)
MCV RBC AUTO: 86.6 FL
MONOCYTES # BLD AUTO: 0.42 X10(3) UL (ref 0.1–1)
MONOCYTES NFR BLD AUTO: 6.2 %
NEUTROPHILS # BLD AUTO: 3.48 X10 (3) UL (ref 1.5–7.7)
NEUTROPHILS # BLD AUTO: 3.48 X10(3) UL (ref 1.5–7.7)
NEUTROPHILS NFR BLD AUTO: 51.8 %
OSMOLALITY SERPL CALC.SUM OF ELEC: 295 MOSM/KG (ref 275–295)
PLATELET # BLD AUTO: 198 10(3)UL (ref 150–450)
POTASSIUM SERPL-SCNC: 3.7 MMOL/L (ref 3.5–5.1)
PROT SERPL-MCNC: 5.9 G/DL (ref 5.7–8.2)
RBC # BLD AUTO: 4.09 X10(6)UL
SODIUM SERPL-SCNC: 143 MMOL/L (ref 136–145)
WBC # BLD AUTO: 6.7 X10(3) UL (ref 4–11)

## 2024-09-06 PROCEDURE — 85025 COMPLETE CBC W/AUTO DIFF WBC: CPT | Performed by: HOSPITALIST

## 2024-09-06 PROCEDURE — 74019 RADEX ABDOMEN 2 VIEWS: CPT | Performed by: PHYSICIAN ASSISTANT

## 2024-09-06 PROCEDURE — 80053 COMPREHEN METABOLIC PANEL: CPT | Performed by: HOSPITALIST

## 2024-09-06 PROCEDURE — 82962 GLUCOSE BLOOD TEST: CPT

## 2024-09-06 RX ORDER — LISINOPRIL 10 MG/1
10 TABLET ORAL DAILY
Status: DISCONTINUED | OUTPATIENT
Start: 2024-09-06 | End: 2024-09-08

## 2024-09-06 NOTE — PROGRESS NOTES
[facility]  Hospitalist Progress Note                                                                     [facility]   part of [facility]        Valarie Guillory  7/28/1950    SUBJECTIVE:  Patient seen and examined.  Passing stools today.  Denies CP/SOB.  NAD.       OBJECTIVE:  Temp:  [97 °F (36.1 °C)-98.2 °F (36.8 °C)] 98.2 °F (36.8 °C)  Pulse:  [68-73] 73  Resp:  [16-18] 16  BP: (118-154)/(56-77) 154/56  SpO2:  [98 %-99 %] 99 %  Exam  Gen: No acute distress, alert and oriented x3, no focal neurologic deficits  Pulm: Lungs clear bilaterally, normal respiratory effort  CV: Heart with regular rate and rhythm, no murmur.  Normal PMI.    Abd: Abdomen soft, nontender, nondistended, no organomegaly, bowel sounds present  MSK: Full range of motion in extremities, no clubbing, no cyanosis  Skin: no rashes or lesions    Labs:   Recent Labs   Lab 09/05/24  0027 09/06/24  0456   WBC 11.8* 6.7   HGB 13.8 12.2   MCV 86.6 86.6   .0 198.0       Recent Labs   Lab 09/05/24  0027 09/06/24  0456    143   K 4.2 3.7    105   CO2 26.0 30.0   BUN 16 12   CREATSERUM 0.89 0.75   CA 10.3 9.4   * 89       Recent Labs   Lab 09/05/24  0027 09/06/24  0456   ALT 17 14   AST 26 18   ALB 4.6 4.0       Recent Labs   Lab 09/05/24  1142 09/05/24  1722 09/05/24  2042 09/06/24  0531   PGLU 152* 73 90 112*       Meds:   Scheduled:    heparin  5,000 Units Subcutaneous Q8H RENUKA    insulin aspart  2-10 Units Subcutaneous TID AC and HS     Continuous Infusions:    lactated ringers 100 mL/hr at 09/05/24 1245     PRN:   acetaminophen    HYDROmorphone **OR** HYDROmorphone **OR** HYDROmorphone    ondansetron    metoclopramide    glucose **OR** glucose **OR** glucose-vitamin C **OR** dextrose **OR** glucose **OR** glucose **OR** glucose-vitamin C    Assessment/Plan:  Principal Problem:    Small bowel obstruction (HCC)    74 year old female with PMH sig for HTN, HL, GERD,  DM2, anxiety and hx of SBO p/w abdominal pain.      SBO, passing stool  - NPO w ice chips  - NGT to LIS, per surgery - clamping trial today   - serial abd exams  - IVF  - obstructive series results pending   - surgery following     Essential HTN  - holding hydrochlorothiazide/triamterene and lisinopril for NPO  - prn iv hydralazine  - if starting po can resume lisinopril today      Hyperlipidemia  - holding statin for NPO     DM2  - accmeeta, ISS     FEN: NPO, PT/OT  Proph: SCDs, lovenox  Code status: Full code      Roney Hinkle MD  [facility]  Message over [employer]  Pager: [phone]

## 2024-09-06 NOTE — PROGRESS NOTES
[facility]  Progress Note    Valarie Guillory Patient Status:  Inpatient    1950 MRN [id number]   Location [facility] 3NW-A Attending Caleb Dockery MD   Hosp Day # 1 PCP Steve Jean MD     Subjective:    Patient reports she is passing flatus, she denies any abdominal pain today  No nausea   NG output 500cc/24hr    Objective/Physical Exam:    Vital Signs:  Blood pressure 154/56, pulse 73, temperature 98.2 °F (36.8 °C), temperature source Oral, resp. rate 16, height 5' 6\" (1.676 m), weight 182 lb (82.6 kg), SpO2 99%, not currently breastfeeding.    General:  Alert, orientated x3.  Cooperative.  No apparent distress.  Abdomen:  soft, non tender, non distended   Labs:  Reviewed    Lab Results   Component Value Date    WBC 6.7 2024    HGB 12.2 2024    HCT 35.4 2024    .0 2024    CREATSERUM 0.75 2024    BUN 12 2024     2024    K 3.7 2024     2024    CO2 30.0 2024    GLU 89 2024    CA 9.4 2024    ALB 4.0 2024    ALKPHO 54 2024    BILT 0.6 2024    TP 5.9 2024    AST 18 2024    ALT 14 2024    PGLU 112 2024       Xray:  Reviewed    Problem List:  Patient Active Problem List   Diagnosis    Hypertension    Smoker    Overweight (BMI 25.0-29.9)    PVD (posterior vitreous detachment)    Hyperlipidemia    Anxiety    Aortic atherosclerosis (HCC)    Type 2 diabetes mellitus without complication, without long-term current use of insulin (HCC)    Uterovaginal prolapse, incomplete    Small bowel obstruction (HCC)           Impression:     73 y/o with SBO- resolving  Passing flatus, nausea resolved, abdominal pain improved    Plan:    Will clamp NG tube x 6 hrs if pain, n/v reconnect to LIS, check residual after 6 hours if less than 150cc can remove and start clears.  Encourage ambulation/IS  All questions answered  DW DR Guillermo Schafer PA  [facility]  Surgery  9/6/2024    Addendum  Patient is in radiology department getting OBS  Will await to review the scan and potentially start po later today

## 2024-09-06 NOTE — PLAN OF CARE
Patient AOX4. VSS. Mild abdominal pain, declines pain medication. NG clamped for 6 hours, 20cc residual, NG removed per order. Patient tolerated well. Clear liquids started. POC discussed with patient, all questions and concerns addressed.

## 2024-09-06 NOTE — PLAN OF CARE
A&Ox4. VSS. .  GI: Abdomen soft, rounded, nondistended, bowel sounds are hypoactive. She reports passing gas.   : Up to the restroom for voids.  No request for pain medications this shift.  Up with standby assist d/t needing help disconnecting the NGT from suction. Output from NGT is pale yellow. NGT air vent was bolused with air to clear material from the port and then the body of the NGT was irrigated and patency was restored.  Diet: NPO except for ice chips and gum.  IVF running per order. All appropriate safety measures in place. All questions and concerns addressed to the best of my ability.

## 2024-09-07 LAB
ANION GAP SERPL CALC-SCNC: 9 MMOL/L (ref 0–18)
BASOPHILS # BLD AUTO: 0.03 X10(3) UL (ref 0–0.2)
BASOPHILS NFR BLD AUTO: 0.6 %
BUN BLD-MCNC: 6 MG/DL (ref 9–23)
CALCIUM BLD-MCNC: 9.1 MG/DL (ref 8.7–10.4)
CHLORIDE SERPL-SCNC: 107 MMOL/L (ref 98–112)
CO2 SERPL-SCNC: 24 MMOL/L (ref 21–32)
CREAT BLD-MCNC: 0.67 MG/DL
EGFRCR SERPLBLD CKD-EPI 2021: 92 ML/MIN/1.73M2 (ref 60–?)
EOSINOPHIL # BLD AUTO: 0.23 X10(3) UL (ref 0–0.7)
EOSINOPHIL NFR BLD AUTO: 4.9 %
ERYTHROCYTE [DISTWIDTH] IN BLOOD BY AUTOMATED COUNT: 11.9 %
GLUCOSE BLD-MCNC: 107 MG/DL (ref 70–99)
GLUCOSE BLD-MCNC: 191 MG/DL (ref 70–99)
GLUCOSE BLD-MCNC: 251 MG/DL (ref 70–99)
GLUCOSE BLD-MCNC: 94 MG/DL (ref 70–99)
GLUCOSE BLD-MCNC: 99 MG/DL (ref 70–99)
HCT VFR BLD AUTO: 34.8 %
HGB BLD-MCNC: 12.2 G/DL
IMM GRANULOCYTES # BLD AUTO: 0.02 X10(3) UL (ref 0–1)
IMM GRANULOCYTES NFR BLD: 0.4 %
LYMPHOCYTES # BLD AUTO: 1.38 X10(3) UL (ref 1–4)
LYMPHOCYTES NFR BLD AUTO: 29.6 %
MCH RBC QN AUTO: 29.8 PG (ref 26–34)
MCHC RBC AUTO-ENTMCNC: 35.1 G/DL (ref 31–37)
MCV RBC AUTO: 84.9 FL
MONOCYTES # BLD AUTO: 0.3 X10(3) UL (ref 0.1–1)
MONOCYTES NFR BLD AUTO: 6.4 %
NEUTROPHILS # BLD AUTO: 2.71 X10 (3) UL (ref 1.5–7.7)
NEUTROPHILS # BLD AUTO: 2.71 X10(3) UL (ref 1.5–7.7)
NEUTROPHILS NFR BLD AUTO: 58.1 %
OSMOLALITY SERPL CALC.SUM OF ELEC: 287 MOSM/KG (ref 275–295)
PLATELET # BLD AUTO: 137 10(3)UL (ref 150–450)
PLATELETS.RETICULATED NFR BLD AUTO: 9.8 % (ref 0–7)
POTASSIUM SERPL-SCNC: 4.2 MMOL/L (ref 3.5–5.1)
RBC # BLD AUTO: 4.1 X10(6)UL
SODIUM SERPL-SCNC: 140 MMOL/L (ref 136–145)
WBC # BLD AUTO: 4.7 X10(3) UL (ref 4–11)

## 2024-09-07 PROCEDURE — 80048 BASIC METABOLIC PNL TOTAL CA: CPT | Performed by: HOSPITALIST

## 2024-09-07 PROCEDURE — 82962 GLUCOSE BLOOD TEST: CPT

## 2024-09-07 PROCEDURE — 85025 COMPLETE CBC W/AUTO DIFF WBC: CPT | Performed by: HOSPITALIST

## 2024-09-07 NOTE — PLAN OF CARE
Pt A&Ox4, resting in bed.  Resp: RA  GI/: voids frequently; no BM yet  Activity/Safety: up ad ladi  Skin: intact  Pain: no meds requested at this time  Pt updated on and agreeable to POC; IV fluids, encourage ambulation (no BM yet), obstr. series (-) SBO; FLD.

## 2024-09-07 NOTE — PLAN OF CARE
Pt VSS, AOx4. Pt up with standby, ambulated around the unit this night with PCT assist. Pt tolerated ambulation well and has been encouraged by this RN to continue ambulation as tolerated for return of bowel function. Pt tolerating clear liquids with no nausea. Pt pain reported by pt as mid soreness only, in lower right abdomen. Pt passing flatus and belching, no BM. Pt reports she \"feels like she may have a BM soon\". Voiding adequate amounts without difficulty. Pt IV fluids LR at 50 ml continued, per orders. SCDs declined. Fall & safety precautions in place. POC discussed.

## 2024-09-07 NOTE — PROGRESS NOTES
[facility], later [facility]  Hospitalist Progress Note                                                                     [facility]   part of [facility]  Valarie Ruffinta  7/28/1950    SUBJECTIVE:  No acute overnight events.  Patient seen at bedside.  Eager to eat, no abdominal pain n/v.  Plan to advance diet today.   Passing flatus, monitor for BM.    OBJECTIVE:  Temp:  [97.2 °F (36.2 °C)-98.2 °F (36.8 °C)] 97.9 °F (36.6 °C)  Pulse:  [51-64] 56  Resp:  [15-18] 18  BP: (142-159)/(63-68) 159/68  SpO2:  [96 %-100 %] 100 %  Exam  Gen: No acute distress, alert and oriented x3, no focal neurologic deficits  Pulm: Lungs clear bilaterally, normal respiratory effort  CV: Heart with regular rate and rhythm, no murmur.   Abd: Abdomen soft, nontender, nondistended, no organomegaly.  MSK: Full range of motion in extremities, no clubbing, no cyanosis  Skin: no rashes or lesions    Labs:   Recent Labs   Lab 09/05/24 0027 09/06/24  0456   WBC 11.8* 6.7   HGB 13.8 12.2   MCV 86.6 86.6   .0 198.0       Recent Labs   Lab 09/05/24  0027 09/06/24  0456 09/07/24  0701    143 140   K 4.2 3.7 4.2    105 107   CO2 26.0 30.0 24.0   BUN 16 12 6*   CREATSERUM 0.89 0.75 0.67   CA 10.3 9.4 9.1   * 89 94       Recent Labs   Lab 09/05/24  0027 09/06/24  0456   ALT 17 14   AST 26 18   ALB 4.6 4.0       Recent Labs   Lab 09/06/24  1151 09/06/24  1555 09/06/24  2212 09/07/24  0542 09/07/24  1133   PGLU 95 105* 109* 107* 99       Meds:   Scheduled:    lisinopril  10 mg Oral Daily    heparin  5,000 Units Subcutaneous Q8H RENUKA    insulin aspart  2-10 Units Subcutaneous TID AC and HS     Continuous Infusions:    lactated ringers 100 mL/hr at 09/05/24 1245     PRN:   acetaminophen    HYDROmorphone **OR** HYDROmorphone **OR** HYDROmorphone    ondansetron    metoclopramide    glucose **OR** glucose **OR** glucose-vitamin C **OR** dextrose **OR** glucose **OR** glucose **OR**  glucose-vitamin C    Assessment/Plan:  Principal Problem:    Small bowel obstruction (HCC)    74 year old female with PMH sig for HTN, HL, GERD, DM2, anxiety and hx of SBO p/w abdominal pain.      SBO  -Advance diet today, ob series w/o evidence of obstruction or ileus.  -DC IVF as tolerating diet today  -Passing flatus, monitor for BM today  -Surgery following, appreciate further recs  -Depending above, dc home today versus tomorrow. Discussed with bedside RN. Patient agreeable.     Essential HTN  - holding hydrochlorothiazide/triamterene and lisinopril  - prn iv hydralazine     Hyperlipidemia  - resume statin when tolerating      DM2  - accucheks, ISS     FEN: NPO, PT/OT  Proph: SCDs, lovenox  Code status: Full code    DO Wendy Power [facility]- Hospitalist

## 2024-09-07 NOTE — PROGRESS NOTES
[facility]  Progress Note    Valarie Guillory Patient Status:  Inpatient    1950 MRN [id number]   Location [facility] 3NW-A Attending Caleb Dockery MD   Hosp Day # 2 PCP Steve Jean MD     Subjective:  Patient is up and ambulating.  No complaints of pain but has some soreness in the abdomen.  She has not had a bowel movement yet.  She continues to have flatus and tolerating diet.    Objective/Physical Exam:  General: No apparent distress.  Vital Signs:  Blood pressure 153/63, pulse 64, temperature 98.2 °F (36.8 °C), temperature source Oral, resp. rate 18, height 5' 6\" (1.676 m), weight 182 lb (82.6 kg), SpO2 99%, not currently breastfeeding.  HEENT: Exam is unremarkable.  Without scleral icterus.  Mucous membranes are moist.     Abdomen:  soft, nondistented, minimal tenderness  Extremities:  No calf tenderness       Intake/Output Summary (Last 24 hours) at 2024 1032  Last data filed at 2024 0911  Gross per 24 hour   Intake 4117 ml   Output 20 ml   Net 4097 ml       Labs:  Lab Results   Component Value Date    CREATSERUM 0.67 2024    BUN 6 2024     2024    K 4.2 2024     2024    CO2 24.0 2024    GLU 94 2024    CA 9.1 2024    PGLU 107 2024       Assessment/Plan: SBO - resolving    -continue with ambulation  -advance diet  -likely home tomorrow  -voiced understanding    Josee Li MD  2024  10:32 AM

## 2024-09-08 VITALS
HEART RATE: 56 BPM | SYSTOLIC BLOOD PRESSURE: 157 MMHG | WEIGHT: 182 LBS | RESPIRATION RATE: 18 BRPM | TEMPERATURE: 98 F | DIASTOLIC BLOOD PRESSURE: 53 MMHG | BODY MASS INDEX: 29.25 KG/M2 | OXYGEN SATURATION: 98 % | HEIGHT: 66 IN

## 2024-09-08 LAB
ANION GAP SERPL CALC-SCNC: 9 MMOL/L (ref 0–18)
BUN BLD-MCNC: 8 MG/DL (ref 9–23)
CALCIUM BLD-MCNC: 9.3 MG/DL (ref 8.7–10.4)
CHLORIDE SERPL-SCNC: 105 MMOL/L (ref 98–112)
CO2 SERPL-SCNC: 26 MMOL/L (ref 21–32)
CREAT BLD-MCNC: 0.7 MG/DL
EGFRCR SERPLBLD CKD-EPI 2021: 91 ML/MIN/1.73M2 (ref 60–?)
ERYTHROCYTE [DISTWIDTH] IN BLOOD BY AUTOMATED COUNT: 12.1 %
GLUCOSE BLD-MCNC: 136 MG/DL (ref 70–99)
GLUCOSE BLD-MCNC: 154 MG/DL (ref 70–99)
GLUCOSE BLD-MCNC: 247 MG/DL (ref 70–99)
HCT VFR BLD AUTO: 32.5 %
HGB BLD-MCNC: 11.7 G/DL
MCH RBC QN AUTO: 30.2 PG (ref 26–34)
MCHC RBC AUTO-ENTMCNC: 36 G/DL (ref 31–37)
MCV RBC AUTO: 83.8 FL
OSMOLALITY SERPL CALC.SUM OF ELEC: 290 MOSM/KG (ref 275–295)
PLATELET # BLD AUTO: 171 10(3)UL (ref 150–450)
POTASSIUM SERPL-SCNC: 3.9 MMOL/L (ref 3.5–5.1)
RBC # BLD AUTO: 3.88 X10(6)UL
SODIUM SERPL-SCNC: 140 MMOL/L (ref 136–145)
WBC # BLD AUTO: 4.4 X10(3) UL (ref 4–11)

## 2024-09-08 PROCEDURE — 85027 COMPLETE CBC AUTOMATED: CPT | Performed by: STUDENT IN AN ORGANIZED HEALTH CARE EDUCATION/TRAINING PROGRAM

## 2024-09-08 PROCEDURE — 80048 BASIC METABOLIC PNL TOTAL CA: CPT | Performed by: STUDENT IN AN ORGANIZED HEALTH CARE EDUCATION/TRAINING PROGRAM

## 2024-09-08 PROCEDURE — 82962 GLUCOSE BLOOD TEST: CPT

## 2024-09-08 NOTE — PROGRESS NOTES
[facility]  Progress Note    Valarie Guillory Patient Status:  Inpatient    1950 MRN [id number]   Location [facility] 3NW-A Attending Caleb Dockery MD   Hosp Day # 3 PCP Steve Jean MD     Subjective:  Patient is up and tolerating full liquid diet.  She had bowel movements and continued to pass flatus.      Objective/Physical Exam:  General: No apparent distress.  Vital Signs:  Blood pressure 148/50, pulse 51, temperature 97.9 °F (36.6 °C), temperature source Oral, resp. rate 16, height 5' 6\" (1.676 m), weight 182 lb (82.6 kg), SpO2 97%, not currently breastfeeding.    Abdomen:  soft  Extremities:  No calf tenderness       Intake/Output Summary (Last 24 hours) at 2024 0719  Last data filed at 2024 0432  Gross per 24 hour   Intake 2084 ml   Output --   Net 2084 ml       Labs:  Lab Results   Component Value Date    WBC 4.4 2024    HGB 11.7 2024    HCT 32.5 2024    .0 2024    PGLU 154 2024       Assessment/Plan: SBO - resolving    -ok to advance soft diet  -ok to discharge from surgery standpoint  -no follow up needed  -voiced understanding    Josee Li MD  2024  7:19 AM

## 2024-09-08 NOTE — DISCHARGE SUMMARY
[facility] Hospitalist Discharge Summary     Patient ID:  Valarie Guillory  74 year old  7/28/1950    Admit date: 9/5/2024  Discharge date and time: 9/8/24  Attending Physician: Devaughn Arvizu DO   Primary Care Physician: Steve Jean MD   Discharge Diagnoses: Small bowel obstruction (HCC) [K56.609]    Discharge Condition: Stable    Disposition:  Home    Hospital Course:   Patient is a 74 year old female with PMH sig for HTN, HL, GERD, DM2, anxiety and hx of SBO p/w abdominal pain. Initially in her upper abdomen and started radiating/moving to her lower abdomen. Reporting nausea and multiple episodes of emesis. Symptoms began yesterday afternoon. Also feeling bloating. Hx of SBO 7 years ago manageed conservatively.  In the ED she was slightly hypertensive. Labs with leukocytosis. Imaging with SBO on CT A/P. Surgery consulted, NGT placed and admitted to floor    SBO  -Advanced diet, tolerating. DC IV fluids  -Passing flatus and now had BM  -No acute complaints  -Cleared by surgery for DC today     Essential HTN  - holding hydrochlorothiazide/triamterene and lisinopril  - prn iv hydralazine     Hyperlipidemia  - resume statin when tolerating      DM2  - accucheks, ISS      Exam on Day of DC:  /53 (BP Location: Left arm)   Pulse 56   Temp 97.8 °F (36.6 °C) (Oral)   Resp 18   Ht 5' 6\" (1.676 m)   Wt 182 lb (82.6 kg)   SpO2 98%   BMI 29.38 kg/m²   General: No apparent distress.  Alert and oriented.  HEENT:  EOMI, PERRLA,   Pulm: Clear breath sounds bilaterally.  Normal respiratory effort.  CV: Regular rate and rhythm, no murmur.   Abd: Soft, nontender, nondistended. \  MSK: Full range of motion in extremities, no obvious deformities.    Skin: No lesions or rashes.  Neuro: No obvious focal deficits.    I as the attending physician reconciled the current and discharge medications on day of discharge.     Current Discharge Medication List        CONTINUE these medications which have NOT CHANGED    Details   estradiol  (ESTRACE) 0.1 MG/GM Vaginal Cream Apply 1/2 gram vaginally 2 times per week.      pyridoxine 100 MG Oral Tab Take 1 tablet (100 mg total) by mouth daily. B6      Coenzyme Q10 (CO Q-10) 100 MG Oral Cap Take 100 mg by mouth daily.      Echinacea 400 MG Oral Cap Take 400 mg by mouth daily.      magnesium oxide 400 MG Oral Tab Take 1 tablet (400 mg total) by mouth daily.      Potassium 99 MG Oral Tab Take 99 mg by mouth daily.      NON FORMULARY Take 1 tablet by mouth daily. [facility] Promise Retore.      Wheat Dextrin (BENEFIBER OR) Take by mouth daily.      Polyethylene Glycol 3350 (MIRALAX OR) Take by mouth daily.      cetirizine 10 MG Oral Tab Take 1 tablet (10 mg total) by mouth daily as needed for Allergies.      Biotin 5000 MCG Oral Cap Take 5,000 mcg by mouth daily.      CRANBERRY EXTRACT OR Take 10,000 mg by mouth daily. [id number] with Vitamin C      Cholecalciferol 125 MCG (5000 UT) Oral Tab Take 1 tablet (5,000 Units total) by mouth daily.      glimepiride 2 MG Oral Tab Take 1 tablet (2 mg total) by mouth daily.      atorvastatin 10 MG Oral Tab Take 1 tablet (10 mg total) by mouth every evening.      Triamterene-HCTZ 37.5-25 MG Oral Tab Take 1 tablet by mouth daily.      metFORMIN HCl 1000 MG Oral Tab Take 1 tablet (1,000 mg total) by mouth 2 (two) times daily with meals.      lisinopril 10 MG Oral Tab Take 1 tablet (10 mg total) by mouth daily.      Glucose Blood (ACCU-CHEK GUIDE) In Vitro Strip 1 strip by In Vitro route daily.           STOP taking these medications       EPINEPHrine (ADRENACLICK) 0.3 MG/0.3ML Injection Solution Auto-injector              Activity: activity as tolerated  Diet: regular diet    Total time coordinating care for discharge: Greater than 30 minutes    DO Wendy Power [facility] Hospitalist

## 2024-09-08 NOTE — PLAN OF CARE
Pt A&Ox4, resting in bed.  Resp: RA  GI/: voids normally, passing gas  Activity/Safety: up ad ladi  Skin: intact  Pain: no meds requested at this time  Pt updated on and agreeable to POC; advancing diet to soft. Possible dc per surg if matthias diet.    1500: discharged to [facility] via wheelchair with PCT. Dtr picking up. IV removed and all belongings returned to pt. AVS reviewed and all questions answered.

## 2024-09-08 NOTE — PLAN OF CARE
Alert, orient. Room air. Tolerating diet, denies nausea. BM today. Voiding freely. Up ad ladi. Saline locked. Reviewed plan of care with patient, probable discharge home tomorrow.

## 2024-09-09 NOTE — PAYOR COMM NOTE
--------------  DISCHARGE REVIEW    [place]or[place] [facility]  [place] #:  [id number]  Authorization Number: [id number]    Admit date: 9/5/24  Admit time:   6:25 AM  Discharge Date: 9/8/2024  3:00 PM         [facility] Hospitalist Discharge Summary     Patient ID:  Valarie Guillory  74 year old  7/28/1950    Admit date: 9/5/2024  Discharge date and time: 9/8/24  Attending Physician: Devaughn Arvizu DO   Primary Care Physician: Steve Jean MD   Discharge Diagnoses: Small bowel obstruction (HCC) [K56.609]    Discharge Condition: Stable    Disposition:  Home    Hospital Course:   Patient is a 74 year old female with PMH sig for HTN, HL, GERD, DM2, anxiety and hx of SBO p/w abdominal pain. Initially in her upper abdomen and started radiating/moving to her lower abdomen. Reporting nausea and multiple episodes of emesis. Symptoms began yesterday afternoon. Also feeling bloating. Hx of SBO 7 years ago manageed conservatively.  In the ED she was slightly hypertensive. Labs with leukocytosis. Imaging with SBO on CT A/P. Surgery consulted, NGT placed and admitted to floor    SBO  -Advanced diet, tolerating. DC IV fluids  -Passing flatus and now had BM  -No acute complaints  -Cleared by surgery for DC today     Essential HTN  - holding hydrochlorothiazide/triamterene and lisinopril  - prn iv hydralazine     Hyperlipidemia  - resume statin when tolerating      DM2  - accucheks, ISS      Exam on Day of DC:  /53 (BP Location: Left arm)   Pulse 56   Temp 97.8 °F (36.6 °C) (Oral)   Resp 18   Ht 5' 6\" (1.676 m)   Wt 182 lb (82.6 kg)   SpO2 98%   BMI 29.38 kg/m²   General: No apparent distress.  Alert and oriented.  HEENT:  EOMI, PERRLA,   Pulm: Clear breath sounds bilaterally.  Normal respiratory effort.  CV: Regular rate and rhythm, no murmur.   Abd: Soft, nontender, nondistended. \  MSK: Full range of motion in extremities, no obvious deformities.    Skin: No lesions or rashes.  Neuro: No obvious focal deficits.    I as the  attending physician reconciled the current and discharge medications on day of discharge.     Current Discharge Medication List        CONTINUE these medications which have NOT CHANGED    Details   estradiol (ESTRACE) 0.1 MG/GM Vaginal Cream Apply 1/2 gram vaginally 2 times per week.      pyridoxine 100 MG Oral Tab Take 1 tablet (100 mg total) by mouth daily. B6      Coenzyme Q10 (CO Q-10) 100 MG Oral Cap Take 100 mg by mouth daily.      Echinacea 400 MG Oral Cap Take 400 mg by mouth daily.      magnesium oxide 400 MG Oral Tab Take 1 tablet (400 mg total) by mouth daily.      Potassium 99 MG Oral Tab Take 99 mg by mouth daily.      NON FORMULARY Take 1 tablet by mouth daily. Eye Promise Retore.      Wheat Dextrin (BENEFIBER OR) Take by mouth daily.      Polyethylene Glycol 3350 (MIRALAX OR) Take by mouth daily.      cetirizine 10 MG Oral Tab Take 1 tablet (10 mg total) by mouth daily as needed for Allergies.      Biotin 5000 MCG Oral Cap Take 5,000 mcg by mouth daily.      CRANBERRY EXTRACT OR Take 10,000 mg by mouth daily. [id number] with Vitamin C      Cholecalciferol 125 MCG (5000 UT) Oral Tab Take 1 tablet (5,000 Units total) by mouth daily.      glimepiride 2 MG Oral Tab Take 1 tablet (2 mg total) by mouth daily.      atorvastatin 10 MG Oral Tab Take 1 tablet (10 mg total) by mouth every evening.      Triamterene-HCTZ 37.5-25 MG Oral Tab Take 1 tablet by mouth daily.      metFORMIN HCl 1000 MG Oral Tab Take 1 tablet (1,000 mg total) by mouth 2 (two) times daily with meals.      lisinopril 10 MG Oral Tab Take 1 tablet (10 mg total) by mouth daily.      Glucose Blood (ACCU-CHEK GUIDE) In Vitro Strip 1 strip by In Vitro route daily.           STOP taking these medications       EPINEPHrine (ADRENACLICK) 0.3 MG/0.3ML Injection Solution Auto-injector              Activity: activity as tolerated  Diet: regular diet    Total time coordinating care for discharge: Greater than 30 minutes    Devaughn Arvizu DO  [facility]  and [facility] Hospitalist    Electronically signed by Devaughn Arvizu DO on 9/8/2024  2:26 PM         REVIEWER COMMENTS

## 2024-11-19 ENCOUNTER — TELEPHONE (OUTPATIENT)
Dept: UROLOGY | Facility: CLINIC | Age: 74
End: 2024-11-19

## 2024-11-26 ENCOUNTER — OFFICE VISIT (OUTPATIENT)
Dept: UROLOGY | Facility: CLINIC | Age: 74
End: 2024-11-26
Attending: OBSTETRICS & GYNECOLOGY
Payer: MEDICARE

## 2024-11-26 VITALS — TEMPERATURE: 98 F | HEIGHT: 66 IN | WEIGHT: 181.38 LBS | BODY MASS INDEX: 29.15 KG/M2

## 2024-11-26 DIAGNOSIS — Z98.890 POST-OPERATIVE STATE: ICD-10-CM

## 2024-11-26 DIAGNOSIS — N95.2 POSTMENOPAUSAL ATROPHIC VAGINITIS: ICD-10-CM

## 2024-11-26 DIAGNOSIS — N81.84 PELVIC MUSCLE WASTING: Primary | ICD-10-CM

## 2024-11-26 PROCEDURE — 99212 OFFICE O/P EST SF 10 MIN: CPT

## 2024-11-26 NOTE — PROGRESS NOTES
She is s/p Post-Op Summary  Procedure Date: 11/17/22  Procedure Name: Vaginal Hysterectomy;Uterosacral Ligament Suspension;Anterior/Posterior/Enterocele Repair;Mid-urethral Sling;Cystoscopy  Post-Op Symptoms: Patient denies pain, NEYMAR, UUI, prolapse symptoms, nausea/vomitting, fevers/chills, bleeding, voiding dysfunction, and defecatory dysfunction.    Doing well   no complaints  Voids freely  No UTIs  BMs irreg - recent SBO & hospitalization  No Pain  Tolerates ambulation & diet  No leakage    No bulge  Happy  Estrogen use intermittent  depressed    Temp 97.7 °F (36.5 °C)   Ht 66\"   Wt 181 lb 6.4 oz (82.3 kg)   BMI 29.28 kg/m²     Gen: NAD  CV: RRR  Pulm: nl effort  Abd: soft  : tolerated vaginal exam. Suture site well healed. No active bleeding. Good support  PROLAPSE ASSESSMENT SCALE:                                                 Aa:-2.5 Ba:-2.5 C:-9   gh: pb: tvl:9   Ap:-2.5 Bp:-2.5 D:     Discussed mgmt of vulvovaginal atrophy with vaginal estrogen cream. Reviewed associated benefits, risks, alternatives, and goals. Recommend low dose twice weekly mgmt   Vag estrogen twice weekly     Reviewed bowel management     Discussed daily pelvic exercises     Call with s/sx of UTI  Plan for follow up in 11/25, sooner prn    All questions answered  She understands and agrees to plan    Aby Elias DO

## (undated) DEVICE — SUT VICRYL 0 J912G

## (undated) DEVICE — LAPAROTOMY SPONGE - RF AND X-RAY DETECTABLE PRE-WASHED: Brand: SITUATE

## (undated) DEVICE — CLOSURE EXOFIN 1.0ML

## (undated) DEVICE — GYN CDS: Brand: MEDLINE INDUSTRIES, INC.

## (undated) DEVICE — VIOLET BRAIDED (POLYGLACTIN 910), SYNTHETIC ABSORBABLE SUTURE: Brand: COATED VICRYL

## (undated) DEVICE — SUT VICRYL 1CT-1  J341H

## (undated) DEVICE — STERILE SYNTHETIC POLYISOPRENE POWDER-FREE SURGICAL GLOVES WITH HYDROGEL COATING, SMOOTH FINISH, STRAIGHT FINGER: Brand: PROTEXIS

## (undated) DEVICE — SUT VICRYL 2-0 CT-1 J945H

## (undated) DEVICE — COVER,MAYO STAND,STERILE: Brand: MEDLINE

## (undated) DEVICE — STERILE POLYISOPRENE POWDER-FREE SURGICAL GLOVES: Brand: PROTEXIS

## (undated) DEVICE — SLEEVE KENDALL SCD EXPRESS MED

## (undated) DEVICE — SUT VICRYL 2-0 CT-2 J269H

## (undated) DEVICE — MEGADYNE ELECTRODE ADULT PT RT

## (undated) DEVICE — PENCIL TELESCOPE MEGADYNE SE

## (undated) DEVICE — SOL H2O IV

## (undated) DEVICE — SUT VICRYL 0 CT-1 J470D

## (undated) DEVICE — TUBING CYSTO

## (undated) DEVICE — USE ITEM #176901

## (undated) DEVICE — SUT CHROMIC GUT 0 CT-1 812H

## (undated) DEVICE — #15 STERILE STAINLESS BLADE: Brand: STERILE STAINLESS BLADES

## (undated) DEVICE — TRAY SURESTEP 16 BARDEX UMETR

## (undated) DEVICE — RETRACTOR LONE STAR STAYS LG

## (undated) DEVICE — MEDI-VAC NON-CONDUCTIVE SUCTION TUBING: Brand: CARDINAL HEALTH

## (undated) DEVICE — SUT VICRYL 0 CT-1 JJ31G

## (undated) DEVICE — BAG DRAIN INFECTION CNTRL 2000

## (undated) DEVICE — ELECTRODE EDGE PENCIL 10FT

## (undated) DEVICE — STANDARD HYPODERMIC NEEDLE,POLYPROPYLENE HUB: Brand: MONOJECT

## (undated) DEVICE — SOLUTION  .9 1000ML BTL

## (undated) DEVICE — SUT VICRYL 0 CT-1 J260H

## (undated) DEVICE — Device

## (undated) NOTE — Clinical Note
Jenet Cushing saw Rc Fast today with bulge. I've recommended bladder testing. I will work to manage her sx. I appreciate the opportunity to participate in her care.  Thanks, [employer]

## (undated) NOTE — IP AVS SNAPSHOT
[facility] [place] [address] [place] [address], [address] ~ [phone]                Discharge Summary   6/20/2017    Atif Regalado           Admission Information        Provider Department    6/20/2017 Chito Miramontes MD  3sw-A MetFORMIN HCl 1000 MG Tabs   Commonly known as:  GLUCOPHAGE        TAKE ONE TABLET BY MOUTH TWO TIMES A DAY WITH FOOD    [facility] LANCETS 66S [address]        TEST BLOOD GLUCOSE TWO TIMES A DAY    [place] WBC RBC Hemoglobin Hematocrit MCV MCH MCHC RDW Platelet MPV    (76/76/06)  5.7 (06/22/17)  4.04 (06/22/17)  11.8 (L) (06/22/17)  35.1 (06/22/17)  86.9 -- -- -- (06/22/17)  170.0 --    (06/20/17)  14.6 (H) (06/20/17)  4.89 (06/20/17)  14.3 (06/20/17)  41. 4 harming yourself, contact [facility] at [phone]. - If you don’t have insurance, Meche Lynch has partnered with Patient [address] to help you get signed up for insurance coverage.   Patient [employer] Use: Treat abnormal blood pressure (high or low), cardiac conditions; and/or abnormal heart rates/rhythms   Most common side effects: Dizziness or feeling lightheaded (especially with standing), heart rate changes, headaches, nausea/vomiting   What to repo Blood Glucose Monitoring Suppl (ONETOUCH ULTRA MINI) W/DEVICE Does not apply Kit